# Patient Record
Sex: FEMALE | Race: WHITE | Employment: OTHER | URBAN - METROPOLITAN AREA
[De-identification: names, ages, dates, MRNs, and addresses within clinical notes are randomized per-mention and may not be internally consistent; named-entity substitution may affect disease eponyms.]

---

## 2018-03-05 ENCOUNTER — HOSPITAL ENCOUNTER (OUTPATIENT)
Dept: PREADMISSION TESTING | Age: 83
Discharge: HOME | End: 2018-03-05
Payer: MEDICARE

## 2018-03-05 VITALS
SYSTOLIC BLOOD PRESSURE: 121 MMHG | HEART RATE: 81 BPM | DIASTOLIC BLOOD PRESSURE: 60 MMHG | TEMPERATURE: 98.4 F | HEIGHT: 65 IN | BODY MASS INDEX: 20.49 KG/M2 | OXYGEN SATURATION: 96 % | WEIGHT: 123 LBS | RESPIRATION RATE: 20 BRPM

## 2018-03-05 LAB
ABO + RH BLD: NORMAL
ALBUMIN SERPL-MCNC: 3.7 G/DL (ref 3.5–5)
ALBUMIN/GLOB SERPL: 1.2 {RATIO} (ref 1.1–2.2)
ALP SERPL-CCNC: 44 U/L (ref 45–117)
ALT SERPL-CCNC: 32 U/L (ref 12–78)
ANION GAP SERPL CALC-SCNC: 9 MMOL/L (ref 5–15)
APPEARANCE UR: CLEAR
APTT PPP: 27.1 SEC (ref 22.1–32)
AST SERPL-CCNC: 22 U/L (ref 15–37)
ATRIAL RATE: 68 BPM
BACTERIA URNS QL MICRO: NEGATIVE /HPF
BASOPHILS # BLD: 0 K/UL (ref 0–0.1)
BASOPHILS NFR BLD: 0 % (ref 0–1)
BILIRUB SERPL-MCNC: 0.3 MG/DL (ref 0.2–1)
BILIRUB UR QL: NEGATIVE
BLOOD GROUP ANTIBODIES SERPL: NORMAL
BUN SERPL-MCNC: 17 MG/DL (ref 6–20)
BUN/CREAT SERPL: 29 (ref 12–20)
CALCIUM SERPL-MCNC: 9.4 MG/DL (ref 8.5–10.1)
CALCULATED P AXIS, ECG09: 79 DEGREES
CALCULATED R AXIS, ECG10: 73 DEGREES
CALCULATED T AXIS, ECG11: 60 DEGREES
CHLORIDE SERPL-SCNC: 106 MMOL/L (ref 97–108)
CO2 SERPL-SCNC: 29 MMOL/L (ref 21–32)
COLOR UR: NORMAL
CREAT SERPL-MCNC: 0.59 MG/DL (ref 0.55–1.02)
DIAGNOSIS, 93000: NORMAL
DIFFERENTIAL METHOD BLD: NORMAL
EOSINOPHIL # BLD: 0.2 K/UL (ref 0–0.4)
EOSINOPHIL NFR BLD: 5 % (ref 0–7)
EPITH CASTS URNS QL MICRO: NORMAL /LPF
ERYTHROCYTE [DISTWIDTH] IN BLOOD BY AUTOMATED COUNT: 12.7 % (ref 11.5–14.5)
EST. AVERAGE GLUCOSE BLD GHB EST-MCNC: 105 MG/DL
GLOBULIN SER CALC-MCNC: 3.2 G/DL (ref 2–4)
GLUCOSE SERPL-MCNC: 87 MG/DL (ref 65–100)
GLUCOSE UR STRIP.AUTO-MCNC: NEGATIVE MG/DL
HBA1C MFR BLD: 5.3 % (ref 4.2–6.3)
HCT VFR BLD AUTO: 42.5 % (ref 35–47)
HGB BLD-MCNC: 13.7 G/DL (ref 11.5–16)
HGB UR QL STRIP: NEGATIVE
HYALINE CASTS URNS QL MICRO: NORMAL /LPF (ref 0–5)
IMM GRANULOCYTES # BLD: 0 K/UL (ref 0–0.04)
IMM GRANULOCYTES NFR BLD AUTO: 0 % (ref 0–0.5)
INR PPP: 1 (ref 0.9–1.1)
KETONES UR QL STRIP.AUTO: NEGATIVE MG/DL
LEUKOCYTE ESTERASE UR QL STRIP.AUTO: NEGATIVE
LYMPHOCYTES # BLD: 1.1 K/UL (ref 0.8–3.5)
LYMPHOCYTES NFR BLD: 22 % (ref 12–49)
MCH RBC QN AUTO: 30.5 PG (ref 26–34)
MCHC RBC AUTO-ENTMCNC: 32.2 G/DL (ref 30–36.5)
MCV RBC AUTO: 94.7 FL (ref 80–99)
MONOCYTES # BLD: 0.5 K/UL (ref 0–1)
MONOCYTES NFR BLD: 10 % (ref 5–13)
NEUTS SEG # BLD: 3 K/UL (ref 1.8–8)
NEUTS SEG NFR BLD: 63 % (ref 32–75)
NITRITE UR QL STRIP.AUTO: NEGATIVE
NRBC # BLD: 0 K/UL (ref 0–0.01)
NRBC BLD-RTO: 0 PER 100 WBC
P-R INTERVAL, ECG05: 146 MS
PH UR STRIP: 6 [PH] (ref 5–8)
PLATELET # BLD AUTO: 243 K/UL (ref 150–400)
PMV BLD AUTO: 11.4 FL (ref 8.9–12.9)
POTASSIUM SERPL-SCNC: 4.5 MMOL/L (ref 3.5–5.1)
PROT SERPL-MCNC: 6.9 G/DL (ref 6.4–8.2)
PROT UR STRIP-MCNC: NEGATIVE MG/DL
PROTHROMBIN TIME: 10.2 SEC (ref 9–11.1)
Q-T INTERVAL, ECG07: 376 MS
QRS DURATION, ECG06: 88 MS
QTC CALCULATION (BEZET), ECG08: 399 MS
RBC # BLD AUTO: 4.49 M/UL (ref 3.8–5.2)
RBC #/AREA URNS HPF: NORMAL /HPF (ref 0–5)
SODIUM SERPL-SCNC: 144 MMOL/L (ref 136–145)
SP GR UR REFRACTOMETRY: 1.02 (ref 1–1.03)
SPECIMEN EXP DATE BLD: NORMAL
THERAPEUTIC RANGE,PTTT: NORMAL SECS (ref 58–77)
UA: UC IF INDICATED,UAUC: NORMAL
UROBILINOGEN UR QL STRIP.AUTO: 0.2 EU/DL (ref 0.2–1)
VENTRICULAR RATE, ECG03: 68 BPM
WBC # BLD AUTO: 4.8 K/UL (ref 3.6–11)
WBC URNS QL MICRO: NORMAL /HPF (ref 0–4)

## 2018-03-05 PROCEDURE — 85610 PROTHROMBIN TIME: CPT | Performed by: ORTHOPAEDIC SURGERY

## 2018-03-05 PROCEDURE — 81001 URINALYSIS AUTO W/SCOPE: CPT | Performed by: ORTHOPAEDIC SURGERY

## 2018-03-05 PROCEDURE — 85730 THROMBOPLASTIN TIME PARTIAL: CPT | Performed by: ORTHOPAEDIC SURGERY

## 2018-03-05 PROCEDURE — 86900 BLOOD TYPING SEROLOGIC ABO: CPT | Performed by: ORTHOPAEDIC SURGERY

## 2018-03-05 PROCEDURE — 83036 HEMOGLOBIN GLYCOSYLATED A1C: CPT | Performed by: ORTHOPAEDIC SURGERY

## 2018-03-05 PROCEDURE — 80053 COMPREHEN METABOLIC PANEL: CPT | Performed by: ORTHOPAEDIC SURGERY

## 2018-03-05 PROCEDURE — 85025 COMPLETE CBC W/AUTO DIFF WBC: CPT | Performed by: ORTHOPAEDIC SURGERY

## 2018-03-05 PROCEDURE — 93005 ELECTROCARDIOGRAM TRACING: CPT

## 2018-03-05 PROCEDURE — 36415 COLL VENOUS BLD VENIPUNCTURE: CPT | Performed by: ORTHOPAEDIC SURGERY

## 2018-03-05 RX ORDER — RISEDRONATE SODIUM 150 MG/1
150 TABLET, FILM COATED ORAL
COMMUNITY

## 2018-03-05 NOTE — PERIOP NOTES
[facility]  PREOPERATIVE INSTRUCTIONS    Surgery Date:   3/19/18      Surgery arrival time given by surgeon: NO  (If [facility] staff will call you between 3pm - 7pm the day before surgery with your arrival time. If your surgery is on a Monday, we will call you the preceding Friday. Please call [phone] after 7pm if you did not receive your arrival time.)  1. Report  to the 2nd Floor Admitting Desk on the day of your surgery. Bring your insurance card, photo identification, and any copayment (if applicable). 2. You must have a responsible adult to drive you home and stay with you the first 24 hours after surgery if you are going home the same day of your surgery. 3. Nothing to eat or drink after midnight the night before surgery. This means NO water, gum, mints, coffee, juice, etc.    4. MEDICATIONS TO TAKE THE MORNING OF SURGERY WITH A SIP OF WATER:Levothyroxine  5. No alcoholic beverages 24 hours before and after your surgery. 6. If you are being admitted to the hospital,please leave personal belongings/luggage in your car until you have an assigned hospital room number. ( The hospital discharge time is 12 PM NOON. Your adult  should be at the hospital prior to the noon discharge time unless otherwise instructed.)   7. STOP Aspirin and/or any non-steroidal anti-inflammatory drugs (i.e. Ibuprofen, Naproxen, Advil, Aleve) as directed by your surgeon. You may take Tylenol. Stop herbal supplements 1 week prior to  surgery. 8. If you are currently taking Plavix, Coumadin,or any other blood-thinning/ anticoagulant medication contact your surgeon for instructions. 9. Wear comfortable clothes. Wear your glasses instead of contacts. Please leave all money, jewelry and valuables at home. No make up, particularly mascara, the day of surgery. 10.  REMOVE ALL body piercings, rings,and jewelry and leave at home. Wear your hair loose or down, no pony-tails, buns, or any metal hair clips.    11. If you shower the morning of surgery, please do not apply any lotions, powders, or deodorants afterwards. Do not shave any body area within 24 hours of your surgery. 12. Please follow all instructions to avoid any potential surgical cancellation. 13. Should your physical condition change, (i.e. fever, cold, flu, etc.) please notify your surgeon as soon as possible. 14. It is important to be on time. If a situation occurs where you may be delayed, please call:  [phone] / ( on the day of surgery. 15. The Preadmission Testing staff can be reached at [phone]. 16.  Special Instructions:  · Use Chlorhexidine Care Fusion wash and sponges 3 days prior to surgery as instructed. · Incentive spirometer given with instructions to practice at home and bring back to the hospital on the day of surgery. · [facility] will contact you if your Hemoglobin A1C is greater than 7.5. · Ensure/Glucerna  sample, nutritional information, and Ensure/Glucerna coupon given. · Pain pamphlet and Call Don't Fall reminder reviewed with patient. ·  parking is complimentary Monday - Friday 7 am - 5 pm  · Bring PTA Medication list day of surgery with the last doses taken documented   · Do not bring medication bottles the day of surgery  16. The patient was contacted  in person. She  verbalize  understanding of all instructions does not  need reinforcement.

## 2018-03-05 NOTE — H&P
PAT Pre-Op History & Physical    Patient: Jesus Alas                  MRN: [id number]          SSN: [id number]  YOB: 1935          Age: [de-identified] y.o. Sex: female                Subjective:     Patient is a [de-identified] y.o.  female who presents with history of fracturing her left shoulder in 2012. She feels like after surgery her shoulder never really healed well. She has had several surgeries since that time. Pain is located in the shoulder and down into the bicep. She states she can feel the head of the shoulder joint pressing forward. She has limited ROM to shoulder. Left arm is weaker. She is right hand dominant. Activity makes the pain worse. Rates pain 2/10 to 5/10. The patient was evaluated in the surgeon's office and it was determined that the most appropriate plan of care is to proceed with surgical intervention.   Patient's PCP Ashanti Sheppard MD      Past Medical History:   Diagnosis Date    Arthritis     Cornea abrasion     Bilateral    Endometrial cancer ([facility] [address] 12/1990    H/O seasonal allergies     Headache(784.0)     Heart disease     Hypercholesteremia     Nausea & vomiting     Skin cancer, basal cell 1999 2001, 2005, 2011                     Scalp, lip and lower leg    Thyroid disease 1980      Past Surgical History:   Procedure Laterality Date    HX FRACTURE TX Left 11/2012    Shoulder    HX HYSTERECTOMY  1990    HX LUMBAR DISKECTOMY N/A 07/2015    HX MALIGNANT SKIN LESION EXCISION N/A 06/1999    Scalp                          Basal Cell    HX MALIGNANT SKIN LESION EXCISION N/A 2001    Nose and Lip- 2001, 2005, 2011                  Basal Cell    HX MALIGNANT SKIN LESION EXCISION Left 09/2017    Lower Leg                            Basal Cell    HX PARATHYROIDECTOMY Right 1980    HX SHOULDER ARTHROSCOPY Left 04/2013    Unsuccessful shaving    HX SHOULDER ARTHROSCOPY Left 02/2014    Capsular release, debridement, open subscapularis repair & lysis of adhesions    HX SHOULDER REPLACEMENT Left 10/2013    HX THYROIDECTOMY Right 03/1980    HX TONSIL AND ADENOIDECTOMY      AS CHILD    HX TUBAL LIGATION  1972      Prior to Admission medications    Medication Sig Start Date End Date Taking? Authorizing Provider   GLUCOSA EDWARD 2KCL/CHONDROITIN EDWARD (GLUCOSAMINE SULF-CHONDROITIN PO) Take 2 Caps by mouth two (2) times a day. 1500mg/1200mg   Yes Historical Provider   risedronate (ACTONEL) 150 mg tablet Take 150 mg by mouth every thirty (30) days. Yes Historical Provider   Calcium-Cholecalciferol, D3, (CALCIUM 600 WITH VITAMIN D3) 600 mg(1,500mg) -400 unit cap Take 1 Tab by mouth daily as needed. Yes Historical Provider   sodium chloride (MAGALY 128) 5 % ophthalmic ointment Administer 1 Drop to both eyes as needed. Yes Historical Provider   cholecalciferol, vitamin D3, (VITAMIN D3) 2,000 unit Tab Take  by mouth daily. Yes Historical Provider   levothyroxine (SYNTHROID) 88 mcg tablet Take  by mouth Daily (before breakfast). Yes Historical Provider   simvastatin (ZOCOR) 20 mg tablet Take  by mouth nightly. Yes Historical Provider   aspirin 81 mg tablet Take 81 mg by mouth nightly.    Yes Historical Provider        Allergies   Allergen Reactions    Adhesive Other (comments)     Has had skin abrasions    Codeine Nausea Only    Hydrocod-Cpm-Pe-Acetaminophen Nausea Only     Not allergic to the tylenol part      Social History   Substance Use Topics    Smoking status: Never Smoker    Smokeless tobacco: Never Used    Alcohol use 3.5 oz/week     7 Glasses of wine per week      Comment: 1 WINE NIGHTLY      History   Drug Use No     Family History   Problem Relation Age of Onset    Stroke Mother     Heart Disease Father     Asthma Maternal Grandmother     Heart Disease Maternal Grandmother     No Known Problems Sister     Cancer Maternal Aunt      breast    Parkinsonism Maternal Uncle     Malignant Hyperthermia Neg Hx     Pseudocholinesterase Deficiency Neg Hx     Delayed Awakening Neg Hx     Post-op Nausea/Vomiting Neg Hx     Post-op Cognitive Dysfunction Neg Hx     Emergence Delirium Neg Hx          Review of Systems    Patient denies difficulty swallowing, mouth sores, or loose teeth. Patient denies any recent dental procedures or any planned prior to surgery. Patient denies chest pain, tightness or palpitations. Denies dizziness, visual disturbances, or lightheadedness. Patient denies shortness of breath, wheezing, cough, fever, or chills. Patient denies diarrhea, constipation, or abdominal pain. Patient denies urinary problems including dysuria, hesitancy, urgency, or incontinence. Denies skin breakdown, rashes, insect bites or open area. Objective:     Vital Signs: 98.4, 81, 20, 96%, 121/60    Body mass index is 20.47 kg/(m^2). Wt Readings from Last 1 Encounters:   03/05/18 55.8 kg (123 lb)        Physical Exam:     General: Pleasant,  cooperative, no apparent distress, appears stated age. Eyes: Conjunctivae/corneas clear. EOMs intact. Nose: Nares normal.   Mouth/Throat: Lips, mucosa, and tongue normal. Teeth and gums normal.   Lungs: Clear to auscultation bilaterally. Heart: Regular rate and rhythm, S1, S2 normal. No murmur, click, rub or gallop. Abdomen: Soft, non-tender. Bowel sounds normal. No distention. Musculoskeletal:  Limited ROM to left shoulder,  strong. Extremities:  Extremities normal, atraumatic, no cyanosis or edema. Calves                                 supple, non tender to palpation. Pulses: 2+ and symmetric bilateral upper extremities. Cap. refill <2 seconds   Skin: Skin color, texture, turgor normal. No visible open areas, examined fully clothed   Neurologic: CN II-XII grossly intact. Alert and oriented x3.     Labs:   Recent Results (from the past 72 hour(s))   CBC WITH AUTOMATED DIFF    Collection Time: 03/05/18  8:29 AM   Result Value Ref Range    WBC 4.8 3.6 - 11.0 K/uL    RBC 4.49 3.80 - 5.20 M/uL    HGB 13.7 11.5 - 16.0 g/dL    HCT 42.5 35.0 - 47.0 %    MCV 94.7 80.0 - 99.0 FL    MCH 30.5 26.0 - 34.0 PG    MCHC 32.2 30.0 - 36.5 g/dL    RDW 12.7 11.5 - 14.5 %    PLATELET 750 166 - 900 K/uL    MPV 11.4 8.9 - 12.9 FL    NRBC 0.0 0  WBC    ABSOLUTE NRBC 0.00 0.00 - 0.01 K/uL    NEUTROPHILS 63 32 - 75 %    LYMPHOCYTES 22 12 - 49 %    MONOCYTES 10 5 - 13 %    EOSINOPHILS 5 0 - 7 %    BASOPHILS 0 0 - 1 %    IMMATURE GRANULOCYTES 0 0.0 - 0.5 %    ABS. NEUTROPHILS 3.0 1.8 - 8.0 K/UL    ABS. LYMPHOCYTES 1.1 0.8 - 3.5 K/UL    ABS. MONOCYTES 0.5 0.0 - 1.0 K/UL    ABS. EOSINOPHILS 0.2 0.0 - 0.4 K/UL    ABS. BASOPHILS 0.0 0.0 - 0.1 K/UL    ABS. IMM. GRANS. 0.0 0.00 - 0.04 K/UL    DF AUTOMATED     METABOLIC PANEL, COMPREHENSIVE    Collection Time: 03/05/18  8:29 AM   Result Value Ref Range    Sodium 144 136 - 145 mmol/L    Potassium 4.5 3.5 - 5.1 mmol/L    Chloride 106 97 - 108 mmol/L    CO2 29 21 - 32 mmol/L    Anion gap 9 5 - 15 mmol/L    Glucose 87 65 - 100 mg/dL    BUN 17 6 - 20 MG/DL    Creatinine 0.59 0.55 - 1.02 MG/DL    BUN/Creatinine ratio 29 (H) 12 - 20      GFR est AA >60 >60 ml/min/1.73m2    GFR est non-AA >60 >60 ml/min/1.73m2    Calcium 9.4 8.5 - 10.1 MG/DL    Bilirubin, total 0.3 0.2 - 1.0 MG/DL    ALT (SGPT) 32 12 - 78 U/L    AST (SGOT) 22 15 - 37 U/L    Alk.  phosphatase 44 (L) 45 - 117 U/L    Protein, total 6.9 6.4 - 8.2 g/dL    Albumin 3.7 3.5 - 5.0 g/dL    Globulin 3.2 2.0 - 4.0 g/dL    A-G Ratio 1.2 1.1 - 2.2     HEMOGLOBIN A1C WITH EAG    Collection Time: 03/05/18  8:29 AM   Result Value Ref Range    Hemoglobin A1c 5.3 4.2 - 6.3 %    Est. average glucose 105 mg/dL   PROTHROMBIN TIME + INR    Collection Time: 03/05/18  8:29 AM   Result Value Ref Range    INR 1.0 0.9 - 1.1      Prothrombin time 10.2 9.0 - 11.1 sec   PTT    Collection Time: 03/05/18  8:29 AM   Result Value Ref Range    aPTT 27.1 22.1 - 32.0 sec    aPTT, therapeutic range     58.0 - 77.0 SECS   URINALYSIS W/ REFLEX CULTURE    Collection Time: 03/05/18  8:29 AM   Result Value Ref Range    Color YELLOW/STRAW      Appearance CLEAR CLEAR      Specific gravity 1.018 1.003 - 1.030      pH (UA) 6.0 5.0 - 8.0      Protein NEGATIVE  NEG mg/dL    Glucose NEGATIVE  NEG mg/dL    Ketone NEGATIVE  NEG mg/dL    Bilirubin NEGATIVE  NEG      Blood NEGATIVE  NEG      Urobilinogen 0.2 0.2 - 1.0 EU/dL    Nitrites NEGATIVE  NEG      Leukocyte Esterase NEGATIVE  NEG      WBC 0-4 0 - 4 /hpf    RBC 0-5 0 - 5 /hpf    Epithelial cells FEW FEW /lpf    Bacteria NEGATIVE  NEG /hpf    UA:UC IF INDICATED CULTURE NOT INDICATED BY UA RESULT CNI      Hyaline cast 0-2 0 - 5 /lpf   TYPE & SCREEN    Collection Time: 03/05/18  8:29 AM   Result Value Ref Range    Crossmatch Expiration 03/19/2018     ABO/Rh(D) A POSITIVE     Antibody screen NEG    EKG, 12 LEAD, INITIAL    Collection Time: 03/05/18  9:09 AM   Result Value Ref Range    Ventricular Rate 68 BPM    Atrial Rate 68 BPM    P-R Interval 146 ms    QRS Duration 88 ms    Q-T Interval 376 ms    QTC Calculation (Bezet) 399 ms    Calculated P Axis 79 degrees    Calculated R Axis 73 degrees    Calculated T Axis 60 degrees    Diagnosis       Normal sinus rhythm with sinus arrhythmia  Possible Left atrial enlargement  Left ventricular hypertrophy  Nonspecific ST abnormality  Abnormal ECG    Confirmed by James Mccray ([id number]) on 3/5/2018 5:16:13 PM         Assessment:     Painful Hardware    Plan:     Scheduled for REVISION LEFT TOTAL SHOULDER ARTHROPLASTY TO REVERSE TOTAL SHOULDER ARTHROPLASTY     All labs reviewed and unremarkable. EKG reviewed. MRSA pending. Cardiac clearance was obtained by patient in January 2018. Clearance reviewed and placed on chart.      Rolanda Torres NP

## 2018-03-06 LAB
BACTERIA SPEC CULT: NORMAL
BACTERIA SPEC CULT: NORMAL
SERVICE CMNT-IMP: NORMAL

## 2018-03-16 ENCOUNTER — ANESTHESIA EVENT (OUTPATIENT)
Dept: SURGERY | Age: 83
DRG: 483 | End: 2018-03-16
Payer: MEDICARE

## 2018-03-19 ENCOUNTER — ANESTHESIA (OUTPATIENT)
Dept: SURGERY | Age: 83
DRG: 483 | End: 2018-03-19
Payer: MEDICARE

## 2018-03-19 ENCOUNTER — HOSPITAL ENCOUNTER (INPATIENT)
Age: 83
LOS: 1 days | Discharge: HOME | DRG: 483 | End: 2018-03-20
Attending: ORTHOPAEDIC SURGERY | Admitting: ORTHOPAEDIC SURGERY
Payer: MEDICARE

## 2018-03-19 ENCOUNTER — APPOINTMENT (OUTPATIENT)
Dept: GENERAL RADIOLOGY | Age: 83
DRG: 483 | End: 2018-03-19
Attending: ORTHOPAEDIC SURGERY
Payer: MEDICARE

## 2018-03-19 DIAGNOSIS — M19.012 PRIMARY LOCALIZED OSTEOARTHROSIS OF LEFT SHOULDER REGION: Primary | ICD-10-CM

## 2018-03-19 PROBLEM — E78.00 HYPERCHOLESTEREMIA: Chronic | Status: ACTIVE | Noted: 2018-03-19

## 2018-03-19 PROBLEM — I51.9 HEART DISEASE: Chronic | Status: ACTIVE | Noted: 2018-03-19

## 2018-03-19 PROBLEM — E03.9 ACQUIRED HYPOTHYROIDISM: Chronic | Status: ACTIVE | Noted: 2018-03-19

## 2018-03-19 PROBLEM — S05.00XA CORNEA ABRASION: Chronic | Status: ACTIVE | Noted: 2018-03-19

## 2018-03-19 PROBLEM — T84.84XA PAINFUL ORTHOPAEDIC HARDWARE (HCC): Status: ACTIVE | Noted: 2018-03-19

## 2018-03-19 PROCEDURE — 77030010507 HC ADH SKN DERMBND J&J -B

## 2018-03-19 PROCEDURE — 74011250636 HC RX REV CODE- 250/636

## 2018-03-19 PROCEDURE — 64415 NJX AA&/STRD BRCH PLXS IMG: CPT

## 2018-03-19 PROCEDURE — 77030020471 HC BIT DRL CREV ZIMM -C: Performed by: ORTHOPAEDIC SURGERY

## 2018-03-19 PROCEDURE — 77030018836 HC SOL IRR NACL ICUM -A: Performed by: ORTHOPAEDIC SURGERY

## 2018-03-19 PROCEDURE — 0RPK0JZ REMOVAL OF SYNTHETIC SUBSTITUTE FROM LEFT SHOULDER JOINT, OPEN APPROACH: ICD-10-PCS | Performed by: ORTHOPAEDIC SURGERY

## 2018-03-19 PROCEDURE — 77030020782 HC GWN BAIR PAWS FLX 3M -B

## 2018-03-19 PROCEDURE — 77030020788: Performed by: ORTHOPAEDIC SURGERY

## 2018-03-19 PROCEDURE — 74011000272 HC RX REV CODE- 272: Performed by: ORTHOPAEDIC SURGERY

## 2018-03-19 PROCEDURE — C1776 JOINT DEVICE (IMPLANTABLE): HCPCS | Performed by: ORTHOPAEDIC SURGERY

## 2018-03-19 PROCEDURE — 3E0T3BZ INTRODUCTION OF ANESTHETIC AGENT INTO PERIPHERAL NERVES AND PLEXI, PERCUTANEOUS APPROACH: ICD-10-PCS | Performed by: ANESTHESIOLOGY

## 2018-03-19 PROCEDURE — 77030018673: Performed by: ORTHOPAEDIC SURGERY

## 2018-03-19 PROCEDURE — 73020 X-RAY EXAM OF SHOULDER: CPT

## 2018-03-19 PROCEDURE — 74011250636 HC RX REV CODE- 250/636: Performed by: ANESTHESIOLOGY

## 2018-03-19 PROCEDURE — 74011250637 HC RX REV CODE- 250/637: Performed by: ORTHOPAEDIC SURGERY

## 2018-03-19 PROCEDURE — 74011000250 HC RX REV CODE- 250

## 2018-03-19 PROCEDURE — 76010000171 HC OR TIME 2 TO 2.5 HR INTENSV-TIER 1: Performed by: ORTHOPAEDIC SURGERY

## 2018-03-19 PROCEDURE — 77030032497 HC WRP SHLDR WO BGS SOLM -B

## 2018-03-19 PROCEDURE — 74011000250 HC RX REV CODE- 250: Performed by: ORTHOPAEDIC SURGERY

## 2018-03-19 PROCEDURE — 77030011640 HC PAD GRND REM COVD -A: Performed by: ORTHOPAEDIC SURGERY

## 2018-03-19 PROCEDURE — 77030013079 HC BLNKT BAIR HGGR 3M -A: Performed by: ANESTHESIOLOGY

## 2018-03-19 PROCEDURE — 77010033678 HC OXYGEN DAILY

## 2018-03-19 PROCEDURE — 74011250637 HC RX REV CODE- 250/637: Performed by: ANESTHESIOLOGY

## 2018-03-19 PROCEDURE — 77030013708 HC HNDPC SUC IRR PULS STRY –B: Performed by: ORTHOPAEDIC SURGERY

## 2018-03-19 PROCEDURE — 74011000250 HC RX REV CODE- 250: Performed by: ANESTHESIOLOGY

## 2018-03-19 PROCEDURE — 77030013837 HC NERV BLK KT BBMI -B

## 2018-03-19 PROCEDURE — 65270000029 HC RM PRIVATE

## 2018-03-19 PROCEDURE — 77030010507 HC ADH SKN DERMBND J&J -B: Performed by: ORTHOPAEDIC SURGERY

## 2018-03-19 PROCEDURE — 76210000016 HC OR PH I REC 1 TO 1.5 HR: Performed by: ORTHOPAEDIC SURGERY

## 2018-03-19 PROCEDURE — 77030031139 HC SUT VCRL2 J&J -A: Performed by: ORTHOPAEDIC SURGERY

## 2018-03-19 PROCEDURE — 77030026438 HC STYL ET INTUB CARD -A: Performed by: ANESTHESIOLOGY

## 2018-03-19 PROCEDURE — 74011250636 HC RX REV CODE- 250/636: Performed by: ORTHOPAEDIC SURGERY

## 2018-03-19 PROCEDURE — 0RRK00Z REPLACEMENT OF LEFT SHOULDER JOINT WITH REVERSE BALL AND SOCKET SYNTHETIC SUBSTITUTE, OPEN APPROACH: ICD-10-PCS | Performed by: ORTHOPAEDIC SURGERY

## 2018-03-19 PROCEDURE — 77030008684 HC TU ET CUF COVD -B: Performed by: ANESTHESIOLOGY

## 2018-03-19 PROCEDURE — 74011250637 HC RX REV CODE- 250/637: Performed by: FAMILY MEDICINE

## 2018-03-19 PROCEDURE — 77030018547 HC SUT ETHBND1 J&J -B: Performed by: ORTHOPAEDIC SURGERY

## 2018-03-19 PROCEDURE — 77030018883 HC BLD SAW SAG4 STRY -B: Performed by: ORTHOPAEDIC SURGERY

## 2018-03-19 PROCEDURE — 77030002933 HC SUT MCRYL J&J -A: Performed by: ORTHOPAEDIC SURGERY

## 2018-03-19 PROCEDURE — 77030019908 HC STETH ESOPH SIMS -A: Performed by: ANESTHESIOLOGY

## 2018-03-19 PROCEDURE — 77030028700 HC BLD TISS PLSM MEDT -E: Performed by: ORTHOPAEDIC SURGERY

## 2018-03-19 PROCEDURE — 76060000035 HC ANESTHESIA 2 TO 2.5 HR: Performed by: ORTHOPAEDIC SURGERY

## 2018-03-19 PROCEDURE — 77030002922 HC SUT FBRWRE ARTH -B: Performed by: ORTHOPAEDIC SURGERY

## 2018-03-19 DEVICE — IMPLANTABLE DEVICE
Type: IMPLANTABLE DEVICE | Site: SHOULDER | Status: FUNCTIONAL
Brand: COMPREHENSIVE REVERSE SHOULDER

## 2018-03-19 DEVICE — IMPLANTABLE DEVICE
Type: IMPLANTABLE DEVICE | Site: SHOULDER | Status: FUNCTIONAL
Brand: COMPREHENSIVE® REVERSE SHOULDER

## 2018-03-19 RX ORDER — ROPIVACAINE HYDROCHLORIDE 5 MG/ML
INJECTION, SOLUTION EPIDURAL; INFILTRATION; PERINEURAL AS NEEDED
Status: DISCONTINUED | OUTPATIENT
Start: 2018-03-19 | End: 2018-03-19 | Stop reason: HOSPADM

## 2018-03-19 RX ORDER — ASPIRIN 325 MG
325 TABLET, DELAYED RELEASE (ENTERIC COATED) ORAL 2 TIMES DAILY
Status: DISCONTINUED | OUTPATIENT
Start: 2018-03-19 | End: 2018-03-20 | Stop reason: HOSPADM

## 2018-03-19 RX ORDER — MIDAZOLAM HYDROCHLORIDE 1 MG/ML
INJECTION, SOLUTION INTRAMUSCULAR; INTRAVENOUS AS NEEDED
Status: DISCONTINUED | OUTPATIENT
Start: 2018-03-19 | End: 2018-03-19 | Stop reason: HOSPADM

## 2018-03-19 RX ORDER — SIMVASTATIN 20 MG/1
20 TABLET, FILM COATED ORAL
Status: DISCONTINUED | OUTPATIENT
Start: 2018-03-19 | End: 2018-03-20 | Stop reason: HOSPADM

## 2018-03-19 RX ORDER — KETOROLAC TROMETHAMINE 30 MG/ML
15 INJECTION, SOLUTION INTRAMUSCULAR; INTRAVENOUS
Status: DISCONTINUED | OUTPATIENT
Start: 2018-03-19 | End: 2018-03-19 | Stop reason: HOSPADM

## 2018-03-19 RX ORDER — CEFAZOLIN SODIUM/WATER 2 G/20 ML
2 SYRINGE (ML) INTRAVENOUS ONCE
Status: COMPLETED | OUTPATIENT
Start: 2018-03-19 | End: 2018-03-19

## 2018-03-19 RX ORDER — SODIUM CHLORIDE 0.9 % (FLUSH) 0.9 %
5-10 SYRINGE (ML) INJECTION AS NEEDED
Status: DISCONTINUED | OUTPATIENT
Start: 2018-03-19 | End: 2018-03-19 | Stop reason: HOSPADM

## 2018-03-19 RX ORDER — SODIUM CHLORIDE, SODIUM LACTATE, POTASSIUM CHLORIDE, CALCIUM CHLORIDE 600; 310; 30; 20 MG/100ML; MG/100ML; MG/100ML; MG/100ML
100 INJECTION, SOLUTION INTRAVENOUS CONTINUOUS
Status: DISCONTINUED | OUTPATIENT
Start: 2018-03-19 | End: 2018-03-19 | Stop reason: HOSPADM

## 2018-03-19 RX ORDER — NEOSTIGMINE METHYLSULFATE 1 MG/ML
INJECTION INTRAVENOUS AS NEEDED
Status: DISCONTINUED | OUTPATIENT
Start: 2018-03-19 | End: 2018-03-19 | Stop reason: HOSPADM

## 2018-03-19 RX ORDER — LIDOCAINE HYDROCHLORIDE 20 MG/ML
INJECTION, SOLUTION EPIDURAL; INFILTRATION; INTRACAUDAL; PERINEURAL AS NEEDED
Status: DISCONTINUED | OUTPATIENT
Start: 2018-03-19 | End: 2018-03-19 | Stop reason: HOSPADM

## 2018-03-19 RX ORDER — PROPOFOL 10 MG/ML
INJECTION, EMULSION INTRAVENOUS AS NEEDED
Status: DISCONTINUED | OUTPATIENT
Start: 2018-03-19 | End: 2018-03-19 | Stop reason: HOSPADM

## 2018-03-19 RX ORDER — ROCURONIUM BROMIDE 10 MG/ML
INJECTION, SOLUTION INTRAVENOUS AS NEEDED
Status: DISCONTINUED | OUTPATIENT
Start: 2018-03-19 | End: 2018-03-19

## 2018-03-19 RX ORDER — ROCURONIUM BROMIDE 10 MG/ML
INJECTION, SOLUTION INTRAVENOUS AS NEEDED
Status: DISCONTINUED | OUTPATIENT
Start: 2018-03-19 | End: 2018-03-19 | Stop reason: HOSPADM

## 2018-03-19 RX ORDER — DIPHENHYDRAMINE HCL 12.5MG/5ML
12.5 ELIXIR ORAL
Status: DISCONTINUED | OUTPATIENT
Start: 2018-03-19 | End: 2018-03-20 | Stop reason: HOSPADM

## 2018-03-19 RX ORDER — OXYCODONE HYDROCHLORIDE 5 MG/1
5 TABLET ORAL
Status: DISCONTINUED | OUTPATIENT
Start: 2018-03-19 | End: 2018-03-20

## 2018-03-19 RX ORDER — SODIUM CHLORIDE 0.9 % (FLUSH) 0.9 %
5-10 SYRINGE (ML) INJECTION EVERY 8 HOURS
Status: DISCONTINUED | OUTPATIENT
Start: 2018-03-19 | End: 2018-03-19 | Stop reason: HOSPADM

## 2018-03-19 RX ORDER — CEFAZOLIN SODIUM/WATER 2 G/20 ML
2 SYRINGE (ML) INTRAVENOUS EVERY 8 HOURS
Status: COMPLETED | OUTPATIENT
Start: 2018-03-19 | End: 2018-03-20

## 2018-03-19 RX ORDER — AMOXICILLIN 250 MG
1 CAPSULE ORAL 2 TIMES DAILY
Status: DISCONTINUED | OUTPATIENT
Start: 2018-03-19 | End: 2018-03-20 | Stop reason: HOSPADM

## 2018-03-19 RX ORDER — ACETAMINOPHEN 325 MG/1
975 TABLET ORAL ONCE
Status: COMPLETED | OUTPATIENT
Start: 2018-03-19 | End: 2018-03-19

## 2018-03-19 RX ORDER — OXYCODONE HYDROCHLORIDE 5 MG/1
5 TABLET ORAL
Status: DISCONTINUED | OUTPATIENT
Start: 2018-03-19 | End: 2018-03-19 | Stop reason: HOSPADM

## 2018-03-19 RX ORDER — POLYETHYLENE GLYCOL 3350 17 G/17G
17 POWDER, FOR SOLUTION ORAL DAILY
Status: DISCONTINUED | OUTPATIENT
Start: 2018-03-20 | End: 2018-03-20 | Stop reason: HOSPADM

## 2018-03-19 RX ORDER — ONDANSETRON 2 MG/ML
INJECTION INTRAMUSCULAR; INTRAVENOUS AS NEEDED
Status: DISCONTINUED | OUTPATIENT
Start: 2018-03-19 | End: 2018-03-19 | Stop reason: HOSPADM

## 2018-03-19 RX ORDER — CELECOXIB 100 MG/1
100 CAPSULE ORAL ONCE
Status: COMPLETED | OUTPATIENT
Start: 2018-03-19 | End: 2018-03-19

## 2018-03-19 RX ORDER — SODIUM CHLORIDE 0.9 % (FLUSH) 0.9 %
5-10 SYRINGE (ML) INJECTION EVERY 8 HOURS
Status: DISCONTINUED | OUTPATIENT
Start: 2018-03-19 | End: 2018-03-20 | Stop reason: HOSPADM

## 2018-03-19 RX ORDER — DEXAMETHASONE SODIUM PHOSPHATE 4 MG/ML
INJECTION, SOLUTION INTRA-ARTICULAR; INTRALESIONAL; INTRAMUSCULAR; INTRAVENOUS; SOFT TISSUE AS NEEDED
Status: DISCONTINUED | OUTPATIENT
Start: 2018-03-19 | End: 2018-03-19 | Stop reason: HOSPADM

## 2018-03-19 RX ORDER — ROPIVACAINE HYDROCHLORIDE 5 MG/ML
INJECTION, SOLUTION EPIDURAL; INFILTRATION; PERINEURAL AS NEEDED
Status: DISCONTINUED | OUTPATIENT
Start: 2018-03-19 | End: 2018-03-19

## 2018-03-19 RX ORDER — LIDOCAINE HYDROCHLORIDE 10 MG/ML
0.1 INJECTION, SOLUTION EPIDURAL; INFILTRATION; INTRACAUDAL; PERINEURAL AS NEEDED
Status: DISCONTINUED | OUTPATIENT
Start: 2018-03-19 | End: 2018-03-19 | Stop reason: HOSPADM

## 2018-03-19 RX ORDER — ONDANSETRON 2 MG/ML
4 INJECTION INTRAMUSCULAR; INTRAVENOUS
Status: DISCONTINUED | OUTPATIENT
Start: 2018-03-19 | End: 2018-03-20 | Stop reason: HOSPADM

## 2018-03-19 RX ORDER — SODIUM CHLORIDE 9 MG/ML
125 INJECTION, SOLUTION INTRAVENOUS CONTINUOUS
Status: DISPENSED | OUTPATIENT
Start: 2018-03-19 | End: 2018-03-20

## 2018-03-19 RX ORDER — SODIUM CHLORIDE 0.9 % (FLUSH) 0.9 %
5-10 SYRINGE (ML) INJECTION AS NEEDED
Status: DISCONTINUED | OUTPATIENT
Start: 2018-03-19 | End: 2018-03-20 | Stop reason: HOSPADM

## 2018-03-19 RX ORDER — EPHEDRINE SULFATE 50 MG/ML
INJECTION, SOLUTION INTRAVENOUS AS NEEDED
Status: DISCONTINUED | OUTPATIENT
Start: 2018-03-19 | End: 2018-03-19 | Stop reason: HOSPADM

## 2018-03-19 RX ORDER — FAMOTIDINE 20 MG/1
20 TABLET, FILM COATED ORAL 2 TIMES DAILY
Status: DISCONTINUED | OUTPATIENT
Start: 2018-03-19 | End: 2018-03-20 | Stop reason: HOSPADM

## 2018-03-19 RX ORDER — NALOXONE HYDROCHLORIDE 0.4 MG/ML
0.4 INJECTION, SOLUTION INTRAMUSCULAR; INTRAVENOUS; SUBCUTANEOUS AS NEEDED
Status: DISCONTINUED | OUTPATIENT
Start: 2018-03-19 | End: 2018-03-20 | Stop reason: HOSPADM

## 2018-03-19 RX ORDER — METOPROLOL TARTRATE 5 MG/5ML
2.5 INJECTION INTRAVENOUS ONCE
Status: COMPLETED | OUTPATIENT
Start: 2018-03-19 | End: 2018-03-19

## 2018-03-19 RX ORDER — FENTANYL CITRATE 50 UG/ML
25 INJECTION, SOLUTION INTRAMUSCULAR; INTRAVENOUS
Status: DISCONTINUED | OUTPATIENT
Start: 2018-03-19 | End: 2018-03-19 | Stop reason: HOSPADM

## 2018-03-19 RX ORDER — HYDROMORPHONE HYDROCHLORIDE 1 MG/ML
.25-1 INJECTION, SOLUTION INTRAMUSCULAR; INTRAVENOUS; SUBCUTANEOUS
Status: DISCONTINUED | OUTPATIENT
Start: 2018-03-19 | End: 2018-03-19 | Stop reason: HOSPADM

## 2018-03-19 RX ORDER — LEVOTHYROXINE SODIUM 88 UG/1
88 TABLET ORAL
Status: DISCONTINUED | OUTPATIENT
Start: 2018-03-20 | End: 2018-03-20 | Stop reason: HOSPADM

## 2018-03-19 RX ORDER — GLYCOPYRROLATE 0.2 MG/ML
INJECTION INTRAMUSCULAR; INTRAVENOUS AS NEEDED
Status: DISCONTINUED | OUTPATIENT
Start: 2018-03-19 | End: 2018-03-19 | Stop reason: HOSPADM

## 2018-03-19 RX ORDER — FENTANYL CITRATE 50 UG/ML
INJECTION, SOLUTION INTRAMUSCULAR; INTRAVENOUS AS NEEDED
Status: DISCONTINUED | OUTPATIENT
Start: 2018-03-19 | End: 2018-03-19 | Stop reason: HOSPADM

## 2018-03-19 RX ORDER — TRAMADOL HYDROCHLORIDE 50 MG/1
50 TABLET ORAL
Status: DISCONTINUED | OUTPATIENT
Start: 2018-03-19 | End: 2018-03-20 | Stop reason: HOSPADM

## 2018-03-19 RX ORDER — SUCCINYLCHOLINE CHLORIDE 20 MG/ML
INJECTION INTRAMUSCULAR; INTRAVENOUS AS NEEDED
Status: DISCONTINUED | OUTPATIENT
Start: 2018-03-19 | End: 2018-03-19 | Stop reason: HOSPADM

## 2018-03-19 RX ORDER — OXYCODONE HYDROCHLORIDE 5 MG/1
10 TABLET ORAL
Status: DISCONTINUED | OUTPATIENT
Start: 2018-03-19 | End: 2018-03-20

## 2018-03-19 RX ORDER — HYDROMORPHONE HYDROCHLORIDE 1 MG/ML
0.5 INJECTION, SOLUTION INTRAMUSCULAR; INTRAVENOUS; SUBCUTANEOUS
Status: DISCONTINUED | OUTPATIENT
Start: 2018-03-19 | End: 2018-03-20 | Stop reason: HOSPADM

## 2018-03-19 RX ADMIN — SUCCINYLCHOLINE CHLORIDE 100 MG: 20 INJECTION INTRAMUSCULAR; INTRAVENOUS at 08:42

## 2018-03-19 RX ADMIN — FENTANYL CITRATE 25 MCG: 50 INJECTION, SOLUTION INTRAMUSCULAR; INTRAVENOUS at 10:15

## 2018-03-19 RX ADMIN — NEOSTIGMINE METHYLSULFATE 3 MG: 1 INJECTION INTRAVENOUS at 10:28

## 2018-03-19 RX ADMIN — MIDAZOLAM HYDROCHLORIDE 1 MG: 1 INJECTION, SOLUTION INTRAMUSCULAR; INTRAVENOUS at 08:37

## 2018-03-19 RX ADMIN — SODIUM CHLORIDE 125 ML/HR: 900 INJECTION, SOLUTION INTRAVENOUS at 19:15

## 2018-03-19 RX ADMIN — ROPIVACAINE HYDROCHLORIDE 20 ML: 5 INJECTION, SOLUTION EPIDURAL; INFILTRATION; PERINEURAL at 07:37

## 2018-03-19 RX ADMIN — LIDOCAINE HYDROCHLORIDE 50 MG: 20 INJECTION, SOLUTION EPIDURAL; INFILTRATION; INTRACAUDAL; PERINEURAL at 08:42

## 2018-03-19 RX ADMIN — FENTANYL CITRATE 50 MCG: 50 INJECTION, SOLUTION INTRAMUSCULAR; INTRAVENOUS at 08:37

## 2018-03-19 RX ADMIN — GLYCOPYRROLATE 0.5 MG: 0.2 INJECTION INTRAMUSCULAR; INTRAVENOUS at 10:28

## 2018-03-19 RX ADMIN — BUPIVACAINE HYDROCHLORIDE 6 ML/HR: 2.5 INJECTION, SOLUTION EPIDURAL; INFILTRATION; INTRACAUDAL; PERINEURAL at 12:24

## 2018-03-19 RX ADMIN — SODIUM CHLORIDE, SODIUM LACTATE, POTASSIUM CHLORIDE, AND CALCIUM CHLORIDE 100 ML/HR: 600; 310; 30; 20 INJECTION, SOLUTION INTRAVENOUS at 06:52

## 2018-03-19 RX ADMIN — SODIUM CHLORIDE, SODIUM LACTATE, POTASSIUM CHLORIDE, AND CALCIUM CHLORIDE: 600; 310; 30; 20 INJECTION, SOLUTION INTRAVENOUS at 09:54

## 2018-03-19 RX ADMIN — FAMOTIDINE 20 MG: 20 TABLET, FILM COATED ORAL at 17:44

## 2018-03-19 RX ADMIN — Medication 2 G: at 09:02

## 2018-03-19 RX ADMIN — FENTANYL CITRATE 50 MCG: 50 INJECTION, SOLUTION INTRAMUSCULAR; INTRAVENOUS at 07:34

## 2018-03-19 RX ADMIN — DOCUSATE SODIUM AND SENNOSIDES 1 TABLET: 8.6; 5 TABLET, FILM COATED ORAL at 14:25

## 2018-03-19 RX ADMIN — EPHEDRINE SULFATE 10 MG: 50 INJECTION, SOLUTION INTRAVENOUS at 08:49

## 2018-03-19 RX ADMIN — DOCUSATE SODIUM AND SENNOSIDES 1 TABLET: 8.6; 5 TABLET, FILM COATED ORAL at 17:44

## 2018-03-19 RX ADMIN — DEXAMETHASONE SODIUM PHOSPHATE 4 MG: 4 INJECTION, SOLUTION INTRA-ARTICULAR; INTRALESIONAL; INTRAMUSCULAR; INTRAVENOUS; SOFT TISSUE at 09:08

## 2018-03-19 RX ADMIN — PROPOFOL 100 MG: 10 INJECTION, EMULSION INTRAVENOUS at 08:42

## 2018-03-19 RX ADMIN — Medication 2 G: at 14:25

## 2018-03-19 RX ADMIN — FAMOTIDINE 20 MG: 20 TABLET, FILM COATED ORAL at 14:25

## 2018-03-19 RX ADMIN — CELECOXIB 100 MG: 100 CAPSULE ORAL at 06:59

## 2018-03-19 RX ADMIN — ROCURONIUM BROMIDE 5 MG: 10 INJECTION, SOLUTION INTRAVENOUS at 08:42

## 2018-03-19 RX ADMIN — MIDAZOLAM HYDROCHLORIDE 2 MG: 1 INJECTION, SOLUTION INTRAMUSCULAR; INTRAVENOUS at 07:34

## 2018-03-19 RX ADMIN — Medication 10 ML: at 22:18

## 2018-03-19 RX ADMIN — EPHEDRINE SULFATE 10 MG: 50 INJECTION, SOLUTION INTRAVENOUS at 09:08

## 2018-03-19 RX ADMIN — ONDANSETRON 4 MG: 2 INJECTION INTRAMUSCULAR; INTRAVENOUS at 09:06

## 2018-03-19 RX ADMIN — Medication 2 G: at 22:18

## 2018-03-19 RX ADMIN — ROCURONIUM BROMIDE 25 MG: 10 INJECTION, SOLUTION INTRAVENOUS at 09:05

## 2018-03-19 RX ADMIN — ASPIRIN 325 MG: 325 TABLET, DELAYED RELEASE ORAL at 17:44

## 2018-03-19 RX ADMIN — SIMVASTATIN 20 MG: 20 TABLET, FILM COATED ORAL at 22:18

## 2018-03-19 RX ADMIN — SODIUM CHLORIDE 125 ML/HR: 900 INJECTION, SOLUTION INTRAVENOUS at 12:15

## 2018-03-19 RX ADMIN — EPHEDRINE SULFATE 10 MG: 50 INJECTION, SOLUTION INTRAVENOUS at 08:33

## 2018-03-19 RX ADMIN — METOROPROLOL TARTRATE 2.5 MG: 5 INJECTION, SOLUTION INTRAVENOUS at 12:07

## 2018-03-19 RX ADMIN — ACETAMINOPHEN 975 MG: 325 TABLET ORAL at 06:59

## 2018-03-19 NOTE — IP AVS SNAPSHOT
[address] [facility] 
 
 
 [address] 
[phone] Patient: Hilda Hartmann MRN: [id number] :[id number] About your hospitalization You were admitted on:  2018 You last received care in the:  [facility] 4M POST SURG ORT 2 You were discharged on:  2018 Why you were hospitalized Your primary diagnosis was:  Painful Orthopaedic Hardware (Hcc) Your diagnoses also included:  Acquired Hypothyroidism, Hypercholesteremia, Primary Localized Osteoarthrosis Of Shoulder Region, Heart Disease, Cornea Abrasion Follow-up Information Follow up With Details Comments Contact Info Orthopedic Physical Therapy On 3/21/2018 9:15AM [address] [address] 
[phone] Alexis Jimenez MD   [address] [facility] [address] [address] 
[phone] Discharge Orders None A check bryant indicates which time of day the medication should be taken. My Medications START taking these medications Instructions Each Dose to Equal  
 Morning Noon Evening Bedtime HYDROmorphone 2 mg tablet Commonly known as:  DILAUDID Take 1 Tab by mouth every four (4) hours as needed for Pain. Max Daily Amount: 12 mg.  
 2 mg CHANGE how you take these medications Instructions Each Dose to Equal  
 Morning Noon Evening Bedtime  
 aspirin 325 mg tablet Commonly known as:  ASPIRIN What changed:   
- medication strength 
- how much to take - when to take this 
- additional instructions Your last dose was:  930 am  
Your next dose is:  600pm  
   
 Take 1 Tab by mouth every twelve (12) hours for 14 days. Take with food 325 mg  
    
930 CONTINUE taking these medications Instructions Each Dose to Equal  
 Morning Noon Evening Bedtime CALCIUM 600 WITH VITAMIN D3 600 mg(1,500mg) -400 unit Cap Generic drug:  Calcium-Cholecalciferol (D3) Take 1 Tab by mouth daily as needed. 1 Tab GLUCOSAMINE SULF-CHONDROITIN PO Take 1 Cap by mouth two (2) times a day. 1500mg/1200mg 1 Cap  
    
   
   
   
  
 levothyroxine 88 mcg tablet Commonly known as:  SYNTHROID Your last dose was:  0704 am  
   
 Take  by mouth Daily (before breakfast). 0704 am  
   
   
   
  
 MAGALY 128 5 % ophthalmic ointment Generic drug:  sodium chloride Administer 1 Drop to both eyes as needed. 1 Drop  
    
   
   
   
  
 risedronate 150 mg tablet Commonly known as:  ACTONEL Take 150 mg by mouth every thirty (30) days. 150 mg  
    
   
   
   
  
 simvastatin 20 mg tablet Commonly known as:  ZOCOR Take  by mouth nightly. VITAMIN D3 2,000 unit Tab Generic drug:  cholecalciferol (vitamin D3) Take  by mouth daily. Where to Get Your Medications Information on where to get these meds will be given to you by the nurse or doctor. ! Ask your nurse or doctor about these medications  
  aspirin 325 mg tablet HYDROmorphone 2 mg tablet Discharge Instructions Shoulder Replacement Surgery Discharge Instructions Dr. Spencer Orozco Please take the time to review the following instructions before you leave the hospital and use them as guidelines during your recovery from surgery. If you have any questions, you may contact my office at [phone]. Wound Care / Dressing Change Do NOT remove your dressing. Keep the clear, plastic dressing intact until your follow up in the office. Chucky Akins / Marieta Goldmann If the clear plastic dressing is intact and there is no drainage, you may shower. If the dressing is loose or water gets under it please notify our office. If there is drainage, please call the office immediately. Sling Keep your arm in the immobilizer/sling at all times except when showering, changing your clothes, and doing the exercises shown to you by Dr. Scarlett Davila or your physical/occupational therapist prior to your discharge from the hospital.  Keep your arm at your side when changing your clothes and showering. Do not move it away from your body. Ice and Elevation Ice therapy should be used consistently for 48 hours after surgery. Subsequently, you should ice 3 times per day for 20 minutes at a time. After the first week, you may use ice as needed for pain and swelling. Please ensure there is protective barrier between your skin and the ice. Diet You may advance your regular diet as tolerated. Increase your clear liquid intake for the next 2-3 days. Medications Your prescriptions were sent to the pharmacy on file. We are unable to change the  narcotic prescription that has already been sent today. If you would like to change your pharmacy for FUTURE prescriptions, please call the office. Pharmacy: [facility]. Pain: You were prescribed a narcotic medication for pain control. Please  use the medication as prescribed. Pain medications may cause constipation  Colace or Milk of Magnesia may be used as needed. Other possible side effects of pain medications are dizziness, headache, nausea, vomiting, and urinary retention. Discontinue the pain medication if you develop itching, rash, shortness of breath, or difficulties swallowing. If these symptoms become severe or arent relieved by discontinuing the medication, you should seek immediate medical attention. You cannot drive or operate machinery while taking narcotic medication. Some pain medications already contain Tylenol/Acetaminophen. Please read your prescription pain medicine bottle prior to taking additional Tylenol. Do not exceed 3000mg of Tylenol/Acetaminophen in a 24 hour period. Refills of pain medication are authorized during office hours only ( Monday to Friday 8am-5pm) 2. Blood Thinner:  You have been given a prescription for Aspirin 325mg. Please take one tablet twice daily for 14 days. Do not take anti-inflammatory medication (Advil, Aleve, Motrin) while taking the blood thinner. 3. Stool Softeners:  Pain medications can cause constipation. Stool softeners, warm prune juice and increasing your water and fiber intake can help prevent constipation. Do not take laxatives. 4. You should resume other medications you were taking prior to your surgery. Pain medication may change the effects of any antidepressant medication you are taking. If you have any questions about possible interactions between your regular medications and the pain medication you should consult the physician who prescribes your regular medications. Physical Therapy: You must begin outpatient physical therapy 2-3 days after your surgery. Your were given a prescription when you scheduled your surgery. If you do not have an appointment scheduled or a therapy prescription please call Dr. Dasha Philip' office immediately. APPOINTMENT: Orthopedic PT 3-21 Follow-Up Appointment: 
Please follow up at your scheduled appointment 10-14 days from the day of your surgery. If you do not already have an appointment, please call our office at [phone] for your follow up appointment. Your appointment will likely be with MYRNA Mcmahan assists Dr. Dasha Philip in surgery and will be able to review your operation and answer your questions. APPOINTMENT: 4-2 at 1:15pm 
 
 
Important Signs and Symptoms: 
If any of the following signs and symptoms occurs, you should contact Dr. Jovan Gomes office. Please be advised if a problem arises which you feel requires immediate medical attention or you are unable to contact Dr. Jovan Gomes office, you should seek immediate medical attention. Signs and symptoms to watch for include: 1. A sudden increase in swelling and/or redness or warmth at the area of your surgery which isnt relieved by rest, ice, and elevation. 2. Oral temperature greater than 101degrees for 12 hours or more which isnt relieved by an increase in fluid intake and taking two Tylenol every 4-6 hours. 3. Excessive drainage from your incisions, or drainage which hasnt stopped by 72 hours after your surgery. 4. Calf pain, ever, chills, shortness of breath, chest pain, nausea, vomiting or other signs and symptoms which are of concern to you. Unless you are having a true medical emergency,  
you MUST call Dr. Liss Martinez' office BEFORE proceeding to the Emergency Department. A provider is on call 24 hours/day. Exercises after Shoulder Surgery 1. Passive Forward Flexion: 
                          
Instructions: 
? Lay on your back ? Take your non surgical arm and grab the wrist of your surgical arm ? Use your non surgical arm to lift your surgical arm over your head with the elbow straight ? Slowly return your arm to your side using your non surgical arm ? Repeat 20 times in the morning and 20 times in the evening Remember: 
- Passive motion: Your arm is lifted with the help of your other hand. o The repair is protected when you do passive motion - Active motion: You lift your arm by using your shoulder muscles. o DO NOT DO ANY ACTIVE MOTION FOR NOW 2. Codman Pendulum Exercises Instructions: 
? Bend forward about 90° at the waist and support yourself on a sturdy object with your non surgical arm  (bend slightly at the knees to protect your back) ? Gently allow your surgical arm to hang towards the ground ? Move your hips forward and backward allowing your arm to swing slightly ? Arm can move forward, backward, and in small circles (clockwise and counterclockwise) o Remember: let your body move your arm, do not use your arm muscles ? Begin performing the exercise for about 30 seconds. Progress to 2 minutes. ? Repeat 2-3 times per day Introducing \[facility] [place] [facility]\"" & HEALTH SERVICES! Dear Enrique Nieto: Thank you for requesting a [employer] account. Our records indicate that you already have an active [employer] account. You can access your account anytime at [email or web address] Did you know that you can access your hospital and ER discharge instructions at any time in [employer]? You can also review all of your test results from your hospital stay or ER visit. Additional Information If you have questions, please visit the Frequently Asked Questions section of the [employer] website at [email or web address] Remember, [employer] is NOT to be used for urgent needs. For medical emergencies, dial 911. Now available from your iPhone and Android! Providers Seen During Your Hospitalization Provider Specialty Primary office phone Mac Dee MD Orthopedic Surgery [phone] Your Primary Care Physician (PCP) Primary Care Physician Office Phone Office Fax [facility] [phone] You are allergic to the following Allergen Reactions Adhesive Other (comments) Has had skin abrasions Codeine Nausea Only Hydrocod-Cpm-Pe-Acetaminophen Nausea Only Not allergic to the tylenol part Recent Documentation Weight Breastfeeding? BMI OB Status Smoking Status 55.8 kg No 20.47 kg/m2 Hysterectomy Never Smoker Emergency Contacts Name Discharge Info Relation Home Work Mobile Sarkis Davis DISCHARGE CAREGIVER [3] Spouse [3] [phone] Patient Belongings The following personal items are in your possession at time of discharge: 
  Dental Appliances: None  Visual Aid: Glasses Please provide this summary of care documentation to your next provider. Signatures-by signing, you are acknowledging that this After Visit Summary has been reviewed with you and you have received a copy. Patient Signature:  ____________________________________________________________ Date:  ____________________________________________________________  
  
[facility] Signature:  ____________________________________________________________ Date:  ____________________________________________________________

## 2018-03-19 NOTE — DISCHARGE INSTRUCTIONS
Shoulder Replacement Surgery Discharge Instructions  Dr. Ora Muhammad    Please take the time to review the following instructions before you leave the hospital and use them as guidelines during your recovery from surgery. If you have any questions, you may contact my office at [phone]. Wound Care / Dressing Change    Do NOT remove your dressing. Keep the clear, plastic dressing intact until your follow up in the office. Showering / Bathing    If the clear plastic dressing is intact and there is no drainage, you may shower. If the dressing is loose or water gets under it please notify our office. If there is drainage, please call the office immediately. Sling    Keep your arm in the immobilizer/sling at all times except when showering, changing your clothes, and doing the exercises shown to you by Dr. Donal Connor or your physical/occupational therapist prior to your discharge from the hospital.  Keep your arm at your side when changing your clothes and showering. Do not move it away from your body. Ice and Elevation    Ice therapy should be used consistently for 48 hours after surgery. Subsequently, you should ice 3 times per day for 20 minutes at a time. After the first week, you may use ice as needed for pain and swelling. Please ensure there is protective barrier between your skin and the ice. Diet    You may advance your regular diet as tolerated. Increase your clear liquid intake for the next 2-3 days. Medications  Your prescriptions were sent to the pharmacy on file. We are unable to change the  narcotic prescription that has already been sent today. If you would like to change your pharmacy for FUTURE prescriptions, please call the office. Pharmacy: [facility]    1. Pain: You were prescribed a narcotic medication for pain control. Please  use the medication as prescribed.   Pain medications may cause constipation - Colace or Milk of Magnesia may be used as needed. Other possible side effects of pain medications are dizziness, headache, nausea, vomiting, and urinary retention. Discontinue the pain medication if you develop itching, rash, shortness of breath, or difficulties swallowing. If these symptoms become severe or arent relieved by discontinuing the medication, you should seek immediate medical attention. You cannot drive or operate machinery while taking narcotic medication. Some pain medications already contain Tylenol/Acetaminophen. Please read your prescription pain medicine bottle prior to taking additional Tylenol. Do not exceed 3000mg of Tylenol/Acetaminophen in a 24 hour period. Refills of pain medication are authorized during office hours only   ( Monday to Friday 8am-5pm)        2. Blood Thinner:  You have been given a prescription for Aspirin 325mg. Please take one tablet twice daily for 14 days. Do not take anti-inflammatory medication (Advil, Aleve, Motrin) while taking the blood thinner. 3. Stool Softeners:  Pain medications can cause constipation. Stool softeners, warm prune juice and increasing your water and fiber intake can help prevent constipation. Do not take laxatives. 4. You should resume other medications you were taking prior to your surgery. Pain medication may change the effects of any antidepressant medication you are taking. If you have any questions about possible interactions between your regular medications and the pain medication you should consult the physician who prescribes your regular medications. Physical Therapy:  You must begin outpatient physical therapy 2-3 days after your surgery. Your were given a prescription when you scheduled your surgery. If you do not have an appointment scheduled or a therapy prescription please call Dr. Rg Kimball' office immediately.      APPOINTMENT: Orthopedic PT 3-21    Follow-Up Appointment:  Please follow up at your scheduled appointment 10-14 days from the day of your surgery. If you do not already have an appointment, please call our office at [phone] for your follow up appointment. Your appointment will likely be with Mj Simms PA-C. Vanessa Gomez assists Dr. Esau Batres in surgery and will be able to review your operation and answer your questions. APPOINTMENT: 4-2 at 1:15pm      Important Signs and Symptoms:  If any of the following signs and symptoms occurs, you should contact Dr. Maicol Jacobs office. Please be advised if a problem arises which you feel requires immediate medical attention or you are unable to contact Dr. Maicol Jacobs office, you should seek immediate medical attention. Signs and symptoms to watch for include:    1. A sudden increase in swelling and/or redness or warmth at the area of your surgery which isnt relieved by rest, ice, and elevation. 2. Oral temperature greater than 101degrees for 12 hours or more which isnt relieved by an increase in fluid intake and taking two Tylenol every 4-6 hours. 3. Excessive drainage from your incisions, or drainage which hasnt stopped by 72 hours after your surgery. 4. Calf pain, ever, chills, shortness of breath, chest pain, nausea, vomiting or other signs and symptoms which are of concern to you. Unless you are having a true medical emergency,   you MUST call Dr. Esau Batres' office   BEFORE proceeding to the Emergency Department. A provider is on call 24 hours/day. Exercises after Shoulder Surgery  1.  Passive Forward Flexion:                             Instructions:  - Lay on your back  - Take your non surgical arm and grab the wrist of your surgical arm   - Use your non surgical arm to lift your surgical arm over your head with the elbow straight   - Slowly return your arm to your side using your non surgical arm  - Repeat 20 times in the morning and 20 times in the evening  Remember:  - Passive motion: Your arm is lifted with the help of your other hand. o The repair is protected when you do passive motion  - Active motion: You lift your arm by using your shoulder muscles. o DO NOT DO ANY ACTIVE MOTION FOR NOW    2. Codman Pendulum Exercises                                         Instructions:  - Bend forward about 90° at the waist and support yourself on a sturdy object with your non surgical arm  (bend slightly at the knees to protect your back)  - Gently allow your surgical arm to hang towards the ground  - Move your hips forward and backward allowing your arm to swing slightly  - Arm can move forward, backward, and in small circles (clockwise and counterclockwise)  o Remember: let your body move your arm, do not use your arm muscles  - Begin performing the exercise for about 30 seconds. Progress to 2 minutes.   - Repeat 2-3 times per day

## 2018-03-19 NOTE — H&P (VIEW-ONLY)
PAT Pre-Op History & Physical    Patient: Amber Cruz                  MRN: [id number]          SSN: [id number]  YOB: 1935          Age: [de-identified] y.o. Sex: female                Subjective:     Patient is a [de-identified] y.o.  female who presents with history of fracturing her left shoulder in 2012. She feels like after surgery her shoulder never really healed well. She has had several surgeries since that time. Pain is located in the shoulder and down into the bicep. She states she can feel the head of the shoulder joint pressing forward. She has limited ROM to shoulder. Left arm is weaker. She is right hand dominant. Activity makes the pain worse. Rates pain 2/10 to 5/10. The patient was evaluated in the surgeon's office and it was determined that the most appropriate plan of care is to proceed with surgical intervention.   Patient's PCP Kalina Vargas MD      Past Medical History:   Diagnosis Date    Arthritis     Cornea abrasion     Bilateral    Endometrial cancer ([facility] [address] 12/1990    H/O seasonal allergies     Headache(784.0)     Heart disease     Hypercholesteremia     Nausea & vomiting     Skin cancer, basal cell 1999 2001, 2005, 2011                     Scalp, lip and lower leg    Thyroid disease 1980      Past Surgical History:   Procedure Laterality Date    HX FRACTURE TX Left 11/2012    Shoulder    HX HYSTERECTOMY  1990    HX LUMBAR DISKECTOMY N/A 07/2015    HX MALIGNANT SKIN LESION EXCISION N/A 06/1999    Scalp                          Basal Cell    HX MALIGNANT SKIN LESION EXCISION N/A 2001    Nose and Lip- 2001, 2005, 2011                  Basal Cell    HX MALIGNANT SKIN LESION EXCISION Left 09/2017    Lower Leg                            Basal Cell    HX PARATHYROIDECTOMY Right 1980    HX SHOULDER ARTHROSCOPY Left 04/2013    Unsuccessful shaving    HX SHOULDER ARTHROSCOPY Left 02/2014    Capsular release, debridement, open subscapularis repair & lysis of adhesions    HX SHOULDER REPLACEMENT Left 10/2013    HX THYROIDECTOMY Right 03/1980    HX TONSIL AND ADENOIDECTOMY      AS CHILD    HX TUBAL LIGATION  1972      Prior to Admission medications    Medication Sig Start Date End Date Taking? Authorizing Provider   GLUCOSA EDWARD 2KCL/CHONDROITIN EDWARD (GLUCOSAMINE SULF-CHONDROITIN PO) Take 2 Caps by mouth two (2) times a day. 1500mg/1200mg   Yes Historical Provider   risedronate (ACTONEL) 150 mg tablet Take 150 mg by mouth every thirty (30) days. Yes Historical Provider   Calcium-Cholecalciferol, D3, (CALCIUM 600 WITH VITAMIN D3) 600 mg(1,500mg) -400 unit cap Take 1 Tab by mouth daily as needed. Yes Historical Provider   sodium chloride (MAGALY 128) 5 % ophthalmic ointment Administer 1 Drop to both eyes as needed. Yes Historical Provider   cholecalciferol, vitamin D3, (VITAMIN D3) 2,000 unit Tab Take  by mouth daily. Yes Historical Provider   levothyroxine (SYNTHROID) 88 mcg tablet Take  by mouth Daily (before breakfast). Yes Historical Provider   simvastatin (ZOCOR) 20 mg tablet Take  by mouth nightly. Yes Historical Provider   aspirin 81 mg tablet Take 81 mg by mouth nightly.    Yes Historical Provider        Allergies   Allergen Reactions    Adhesive Other (comments)     Has had skin abrasions    Codeine Nausea Only    Hydrocod-Cpm-Pe-Acetaminophen Nausea Only     Not allergic to the tylenol part      Social History   Substance Use Topics    Smoking status: Never Smoker    Smokeless tobacco: Never Used    Alcohol use 3.5 oz/week     7 Glasses of wine per week      Comment: 1 WINE NIGHTLY      History   Drug Use No     Family History   Problem Relation Age of Onset    Stroke Mother     Heart Disease Father     Asthma Maternal Grandmother     Heart Disease Maternal Grandmother     No Known Problems Sister     Cancer Maternal Aunt      breast    Parkinsonism Maternal Uncle     Malignant Hyperthermia Neg Hx     Pseudocholinesterase Deficiency Neg Hx     Delayed Awakening Neg Hx     Post-op Nausea/Vomiting Neg Hx     Post-op Cognitive Dysfunction Neg Hx     Emergence Delirium Neg Hx          Review of Systems    Patient denies difficulty swallowing, mouth sores, or loose teeth. Patient denies any recent dental procedures or any planned prior to surgery. Patient denies chest pain, tightness or palpitations. Denies dizziness, visual disturbances, or lightheadedness. Patient denies shortness of breath, wheezing, cough, fever, or chills. Patient denies diarrhea, constipation, or abdominal pain. Patient denies urinary problems including dysuria, hesitancy, urgency, or incontinence. Denies skin breakdown, rashes, insect bites or open area. Objective:     Vital Signs: 98.4, 81, 20, 96%, 121/60    Body mass index is 20.47 kg/(m^2). Wt Readings from Last 1 Encounters:   03/05/18 55.8 kg (123 lb)        Physical Exam:     General: Pleasant,  cooperative, no apparent distress, appears stated age. Eyes: Conjunctivae/corneas clear. EOMs intact. Nose: Nares normal.   Mouth/Throat: Lips, mucosa, and tongue normal. Teeth and gums normal.   Lungs: Clear to auscultation bilaterally. Heart: Regular rate and rhythm, S1, S2 normal. No murmur, click, rub or gallop. Abdomen: Soft, non-tender. Bowel sounds normal. No distention. Musculoskeletal:  Limited ROM to left shoulder,  strong. Extremities:  Extremities normal, atraumatic, no cyanosis or edema. Calves                                 supple, non tender to palpation. Pulses: 2+ and symmetric bilateral upper extremities. Cap. refill <2 seconds   Skin: Skin color, texture, turgor normal. No visible open areas, examined fully clothed   Neurologic: CN II-XII grossly intact. Alert and oriented x3.     Labs:   Recent Results (from the past 72 hour(s))   CBC WITH AUTOMATED DIFF    Collection Time: 03/05/18  8:29 AM   Result Value Ref Range    WBC 4.8 3.6 - 11.0 K/uL    RBC 4.49 3.80 - 5.20 M/uL    HGB 13.7 11.5 - 16.0 g/dL    HCT 42.5 35.0 - 47.0 %    MCV 94.7 80.0 - 99.0 FL    MCH 30.5 26.0 - 34.0 PG    MCHC 32.2 30.0 - 36.5 g/dL    RDW 12.7 11.5 - 14.5 %    PLATELET 231 536 - 541 K/uL    MPV 11.4 8.9 - 12.9 FL    NRBC 0.0 0  WBC    ABSOLUTE NRBC 0.00 0.00 - 0.01 K/uL    NEUTROPHILS 63 32 - 75 %    LYMPHOCYTES 22 12 - 49 %    MONOCYTES 10 5 - 13 %    EOSINOPHILS 5 0 - 7 %    BASOPHILS 0 0 - 1 %    IMMATURE GRANULOCYTES 0 0.0 - 0.5 %    ABS. NEUTROPHILS 3.0 1.8 - 8.0 K/UL    ABS. LYMPHOCYTES 1.1 0.8 - 3.5 K/UL    ABS. MONOCYTES 0.5 0.0 - 1.0 K/UL    ABS. EOSINOPHILS 0.2 0.0 - 0.4 K/UL    ABS. BASOPHILS 0.0 0.0 - 0.1 K/UL    ABS. IMM. GRANS. 0.0 0.00 - 0.04 K/UL    DF AUTOMATED     METABOLIC PANEL, COMPREHENSIVE    Collection Time: 03/05/18  8:29 AM   Result Value Ref Range    Sodium 144 136 - 145 mmol/L    Potassium 4.5 3.5 - 5.1 mmol/L    Chloride 106 97 - 108 mmol/L    CO2 29 21 - 32 mmol/L    Anion gap 9 5 - 15 mmol/L    Glucose 87 65 - 100 mg/dL    BUN 17 6 - 20 MG/DL    Creatinine 0.59 0.55 - 1.02 MG/DL    BUN/Creatinine ratio 29 (H) 12 - 20      GFR est AA >60 >60 ml/min/1.73m2    GFR est non-AA >60 >60 ml/min/1.73m2    Calcium 9.4 8.5 - 10.1 MG/DL    Bilirubin, total 0.3 0.2 - 1.0 MG/DL    ALT (SGPT) 32 12 - 78 U/L    AST (SGOT) 22 15 - 37 U/L    Alk.  phosphatase 44 (L) 45 - 117 U/L    Protein, total 6.9 6.4 - 8.2 g/dL    Albumin 3.7 3.5 - 5.0 g/dL    Globulin 3.2 2.0 - 4.0 g/dL    A-G Ratio 1.2 1.1 - 2.2     HEMOGLOBIN A1C WITH EAG    Collection Time: 03/05/18  8:29 AM   Result Value Ref Range    Hemoglobin A1c 5.3 4.2 - 6.3 %    Est. average glucose 105 mg/dL   PROTHROMBIN TIME + INR    Collection Time: 03/05/18  8:29 AM   Result Value Ref Range    INR 1.0 0.9 - 1.1      Prothrombin time 10.2 9.0 - 11.1 sec   PTT    Collection Time: 03/05/18  8:29 AM   Result Value Ref Range    aPTT 27.1 22.1 - 32.0 sec    aPTT, therapeutic range     58.0 - 77.0 SECS   URINALYSIS W/ REFLEX CULTURE    Collection Time: 03/05/18  8:29 AM   Result Value Ref Range    Color YELLOW/STRAW      Appearance CLEAR CLEAR      Specific gravity 1.018 1.003 - 1.030      pH (UA) 6.0 5.0 - 8.0      Protein NEGATIVE  NEG mg/dL    Glucose NEGATIVE  NEG mg/dL    Ketone NEGATIVE  NEG mg/dL    Bilirubin NEGATIVE  NEG      Blood NEGATIVE  NEG      Urobilinogen 0.2 0.2 - 1.0 EU/dL    Nitrites NEGATIVE  NEG      Leukocyte Esterase NEGATIVE  NEG      WBC 0-4 0 - 4 /hpf    RBC 0-5 0 - 5 /hpf    Epithelial cells FEW FEW /lpf    Bacteria NEGATIVE  NEG /hpf    UA:UC IF INDICATED CULTURE NOT INDICATED BY UA RESULT CNI      Hyaline cast 0-2 0 - 5 /lpf   TYPE & SCREEN    Collection Time: 03/05/18  8:29 AM   Result Value Ref Range    Crossmatch Expiration 03/19/2018     ABO/Rh(D) A POSITIVE     Antibody screen NEG    EKG, 12 LEAD, INITIAL    Collection Time: 03/05/18  9:09 AM   Result Value Ref Range    Ventricular Rate 68 BPM    Atrial Rate 68 BPM    P-R Interval 146 ms    QRS Duration 88 ms    Q-T Interval 376 ms    QTC Calculation (Bezet) 399 ms    Calculated P Axis 79 degrees    Calculated R Axis 73 degrees    Calculated T Axis 60 degrees    Diagnosis       Normal sinus rhythm with sinus arrhythmia  Possible Left atrial enlargement  Left ventricular hypertrophy  Nonspecific ST abnormality  Abnormal ECG    Confirmed by Chuck Streeter ([id number]) on 3/5/2018 5:16:13 PM         Assessment:     Painful Hardware    Plan:     Scheduled for REVISION LEFT TOTAL SHOULDER ARTHROPLASTY TO REVERSE TOTAL SHOULDER ARTHROPLASTY     All labs reviewed and unremarkable. EKG reviewed. MRSA pending. Cardiac clearance was obtained by patient in January 2018. Clearance reviewed and placed on chart.      Moose Castro NP

## 2018-03-19 NOTE — PROGRESS NOTES
3/19/2018  2:59 PM  CM met with pt for assessment. Demographics and PCP were confirmed. Pt is a [de-identified]year old,  female who lives in a private residence with her  (4 exterior, 1 flight interior). PTA, pt was able to complete ADLs without the use of DME. Pt has prescription drug coverage, and prefers [facility] [place] [id number]. Pt was aware of her scheduled outpatient PT [facility] on 3/21/18 at 9:15am. No additional discharge service needs indicated. CM will continue to monitor for finalized discharge recommendations. Beck Millan MA    Care Management Interventions  PCP Verified by CM: Yes Aline Given)  Palliative Care Criteria Met (RRAT>21 & CHF Dx)?: No  Mode of Transport at Discharge:  Other (see comment) ()  MyChart Signup: No  Discharge Durable Medical Equipment: No  Physical Therapy Consult: Yes  Occupational Therapy Consult: Yes  Speech Therapy Consult: No  Current Support Network: Lives with Spouse  Confirm Follow Up Transport: Family  Plan discussed with Pt/Family/Caregiver: Yes  Discharge Location  Discharge Placement: Home with outpatient services

## 2018-03-19 NOTE — CONSULTS
[address] [facility] [address] [place] [address]   Office [phone]  Fax [phone]       Initial Consult Note     Name: David Don MRN: [id number]  Sex: female    YOB: 1935  Age: [de-identified] y.o. PCP: Galileo Dai MD     Date of admission:    3/19/2018  Date of consultation:   3/19/2018  Requesting physician:   Maliha Ba MD  Reason for consultation:   Medical Management    History of present illness  David Don is a [de-identified] y.o. female who is POD 0  after L total shoulder arthroplasty. Had tachycardia to 109 postoperatively, now 92-99. Pt is pleasant, conversant. States she is doing well, sleepy, finished surgery just over an hour ago. Denies N/V, HA, dizziness, fever, chills, abdominal pain. Denies any anxiety, chest pain, SOB, or heart palpitations. Denies any pain at all. No complaints at this time. Is sitting up in bed eating soup,  at bedside. Home Medications   Prior to Admission medications    Medication Sig Start Date End Date Taking? Authorizing Provider   sodium chloride (MAGALY 128) 5 % ophthalmic ointment Administer 1 Drop to both eyes as needed. Yes Historical Provider   cholecalciferol, vitamin D3, (VITAMIN D3) 2,000 unit Tab Take  by mouth daily. Yes Historical Provider   levothyroxine (SYNTHROID) 88 mcg tablet Take  by mouth Daily (before breakfast). Yes Historical Provider   GLUCOSA EDWARD 2KCL/CHONDROITIN EDWARD (GLUCOSAMINE SULF-CHONDROITIN PO) Take 1 Cap by mouth two (2) times a day. 1500mg/1200mg     Historical Provider   risedronate (ACTONEL) 150 mg tablet Take 150 mg by mouth every thirty (30) days. Historical Provider   Calcium-Cholecalciferol, D3, (CALCIUM 600 WITH VITAMIN D3) 600 mg(1,500mg) -400 unit cap Take 1 Tab by mouth daily as needed. Historical Provider   simvastatin (ZOCOR) 20 mg tablet Take  by mouth nightly. Historical Provider   aspirin 81 mg tablet Take 81 mg by mouth nightly.     Historical Provider       Allergies   Allergies Allergen Reactions    Adhesive Other (comments)     Has had skin abrasions    Codeine Nausea Only    Hydrocod-Cpm-Pe-Acetaminophen Nausea Only     Not allergic to the tylenol part       Past Medical History   Past Medical History:   Diagnosis Date    Arthritis     Cornea abrasion     Bilateral    Endometrial cancer ([facility] [address] 12/1990    H/O seasonal allergies     Headache(784.0)     Heart disease     Hypercholesteremia     Nausea & vomiting     Skin cancer, basal cell 1999 2001, 2005, 2011                     Scalp, lip and lower leg    Thyroid disease 1980       Past Surgical History  Past Surgical History:   Procedure Laterality Date    HX FRACTURE TX Left 11/2012    Shoulder    HX HYSTERECTOMY  1990    HX LUMBAR DISKECTOMY N/A 07/2015    HX MALIGNANT SKIN LESION EXCISION N/A 06/1999    Scalp                          Basal Cell    HX MALIGNANT SKIN LESION EXCISION N/A 2001    Nose and Lip- 2001, 2005, 2011                  Basal Cell    HX MALIGNANT SKIN LESION EXCISION Left 09/2017    Lower Leg                            Basal Cell    HX PARATHYROIDECTOMY Right 1980    HX SHOULDER ARTHROSCOPY Left 04/2013    Unsuccessful shaving    HX SHOULDER ARTHROSCOPY Left 02/2014    Capsular release, debridement, open subscapularis repair & lysis of adhesions    HX SHOULDER REPLACEMENT Left 10/2013    HX THYROIDECTOMY Right 03/1980    HX TONSIL AND ADENOIDECTOMY      AS CHILD    HX TUBAL LIGATION  1972       Family History  Family History   Problem Relation Age of Onset    Stroke Mother     Heart Disease Father     Asthma Maternal Grandmother     Heart Disease Maternal Grandmother     No Known Problems Sister     Cancer Maternal Aunt      breast    Parkinsonism Maternal Uncle     Malignant Hyperthermia Neg Hx     Pseudocholinesterase Deficiency Neg Hx     Delayed Awakening Neg Hx     Post-op Nausea/Vomiting Neg Hx     Post-op Cognitive Dysfunction Neg Hx     Emergence Delirium Neg Hx Social History   Living arrangements: patient lives with their spouse. Ambulates: Independently     Alcohol history: 1 glass of wine w dinner ever evening    Smoking history: non-smoker    Illicit drug history: no history of illicit drug use    Sexual history: heterosexual    Review of Systems  Review of Systems   Constitutional: Negative for activity change, chills, fatigue and fever. Respiratory: Negative for cough and shortness of breath. Cardiovascular: Negative for chest pain and leg swelling. Gastrointestinal: Negative for abdominal pain, constipation, diarrhea and nausea. Musculoskeletal: Negative for back pain and joint swelling. Neurological: Negative for dizziness, light-headedness and headaches. Physical Exam  Objective:  General Appearance:  Comfortable and well-appearing. Vital signs: (most recent): Blood pressure 114/55, pulse 99, temperature 97.7 °F (36.5 °C), resp. rate 16, weight 123 lb (55.8 kg), SpO2 95 %. No fever. HEENT: Normal HEENT exam.    Lungs:  Normal respiratory rate and normal effort. She is not in respiratory distress. Breath sounds clear to auscultation. No rales. Heart: Normal rate. Regular rhythm. No murmur. Extremities: There is no dependent edema.  (L shoulder in sling)  Abdomen: Abdomen is soft and non-distended. Bowel sounds are normal.   There is no abdominal tenderness. O2 Flow Rate (L/min): 2 l/min O2 Device: Nasal cannula     Laboratory Data  None. Imaging:  None         Impression / Recommendations     Lidia Rollins is a [de-identified] y.o. female with hx of hypothyroidism s/p thyroidectomy, HLD, arthritis who is s/p L total shoulder arthroplasty. The Family Medicine Service was consulted for medical management. S/p L total shoulder arthroplasty 3/19/18 - POD0  - Pain regimen per Ortho    Hypothyroidism - unable to see last records as PCP not in [facility]. Patient reportedly compliant with home medications.    - Continue home levothyroxine 88mcg    HLD - Continue home simvastatin 20mg qhs    Increased O2 requirements - on 2L NC in the immediately postop period, attempt to wean as tolerated. Patient not on any home oxygen. FEN/GI - Per primary team.   Activity - Per primary team  DVT prophylaxis - SCD's  GI prophylaxis - Not indicated at this time  Disposition - Plan to d/c to Per primary team.  Code Status - Full, discussed with patient / caregivers. Thank you very much for allowing us to participate in the care of this pleasant patient. The family medicine service will continue to follow the patient's medical progress along with you. Please do not hesitate to page with any questions or to discuss the case (Team phone [phone]). Patient will be discussed with Dr. Shukri Bales by:     Eda Rinne, MD  Family Medicine Resident        For Billing    No chief complaint on file. Hospital Problems  Date Reviewed: 4/18/2013          Codes Class Noted POA    * (Principal)Painful orthopaedic hardware [facility]) ICD-10-CM: D26.62DE  ICD-9-CM: 996.78  3/19/2018 Yes        Acquired hypothyroidism (Chronic) ICD-10-CM: E03.9  ICD-9-CM: 244.9  3/19/2018 Yes        Hypercholesteremia (Chronic) ICD-10-CM: E78.00  ICD-9-CM: 272.0  3/19/2018 Yes        Heart disease (Chronic) ICD-10-CM: I51.9  ICD-9-CM: 429.9  3/19/2018 Yes        Cornea abrasion (Chronic) ICD-10-CM: S05. 00XA  ICD-9-CM: 918.1  3/19/2018 Yes    Overview Signed 3/19/2018  1:30 PM by Elodia Barajas MD     Bilateral             Primary localized osteoarthrosis of shoulder region ICD-10-CM: M19.019  ICD-9-CM: 715.11  4/17/2013 Yes

## 2018-03-19 NOTE — ADDENDUM NOTE
Addendum  created 03/19/18 1252 by Mac Do CRNA    Anesthesia Intra Flowsheets edited, Anesthesia Intra Meds edited

## 2018-03-19 NOTE — PERIOP NOTES
TRANSFER - OUT REPORT:    Verbal report given to RN CARRIE( on Lidia Rollins  being transferred to [facility] for routine post - op       Report consisted of patients Situation, Background, Assessment and   Recommendations(SBAR). Information from the following report(s) SBAR, OR Summary, Procedure Summary, Intake/Output and MAR was reviewed with the receiving nurse. Lines:   Peripheral IV 03/19/18 Right Hand (Active)   Site Assessment Clean, dry, & intact 3/19/2018 12:15 PM   Phlebitis Assessment 0 3/19/2018 12:15 PM   Infiltration Assessment 0 3/19/2018 12:15 PM   Dressing Status Clean, dry, & intact; Occlusive 3/19/2018 12:15 PM   Dressing Type Tape;Transparent 3/19/2018 12:15 PM   Hub Color/Line Status Pink; Infusing 3/19/2018 12:15 PM   Alcohol Cap Used Yes 3/19/2018 12:15 PM        Opportunity for questions and clarification was provided.       Patient transported with:   O2 @ 2 liters  Registered Nurse

## 2018-03-19 NOTE — IP AVS SNAPSHOT
[address] 
[phone] Patient: David Don MRN: [id number] :1935 A check bryant indicates which time of day the medication should be taken. My Medications START taking these medications Instructions Each Dose to Equal  
 Morning Noon Evening Bedtime HYDROmorphone 2 mg tablet Commonly known as:  DILAUDID Take 1 Tab by mouth every four (4) hours as needed for Pain. Max Daily Amount: 12 mg.  
 2 mg CHANGE how you take these medications Instructions Each Dose to Equal  
 Morning Noon Evening Bedtime  
 aspirin 325 mg tablet Commonly known as:  ASPIRIN What changed:   
- medication strength 
- how much to take - when to take this 
- additional instructions Your last dose was:  930 am  
Your next dose is:  600pm  
   
 Take 1 Tab by mouth every twelve (12) hours for 14 days. Take with food 325 mg  
    
930 CONTINUE taking these medications Instructions Each Dose to Equal  
 Morning Noon Evening Bedtime CALCIUM 600 WITH VITAMIN D3 600 mg(1,500mg) -400 unit Cap Generic drug:  Calcium-Cholecalciferol (D3) Take 1 Tab by mouth daily as needed. 1 Tab GLUCOSAMINE SULF-CHONDROITIN PO Take 1 Cap by mouth two (2) times a day. 1500mg/1200mg 1 Cap  
    
   
   
   
  
 levothyroxine 88 mcg tablet Commonly known as:  SYNTHROID Your last dose was:  0704 am  
   
 Take  by mouth Daily (before breakfast). 0704 am  
   
   
   
  
 MAGALY 128 5 % ophthalmic ointment Generic drug:  sodium chloride Administer 1 Drop to both eyes as needed. 1 Drop  
    
   
   
   
  
 risedronate 150 mg tablet Commonly known as:  ACTONEL Take 150 mg by mouth every thirty (30) days. 150 mg  
    
   
   
   
  
 simvastatin 20 mg tablet Commonly known as:  ZOCOR  
   
 Take  by mouth nightly. VITAMIN D3 2,000 unit Tab Generic drug:  cholecalciferol (vitamin D3) Take  by mouth daily. Where to Get Your Medications Information on where to get these meds will be given to you by the nurse or doctor. ! Ask your nurse or doctor about these medications  
  aspirin 325 mg tablet HYDROmorphone 2 mg tablet

## 2018-03-19 NOTE — INTERVAL H&P NOTE
H&P Update:  Marilee Morgan was seen and examined. History and physical has been reviewed. The patient has been examined.  There have been no significant clinical changes since the completion of the originally dated History and Physical.    Signed By: Kristi Klein MD     March 19, 2018 7:30 AM

## 2018-03-19 NOTE — ANESTHESIA PROCEDURE NOTES
Peripheral Block    Start time: 3/19/2018 7:33 AM  End time: 3/19/2018 7:43 AM  Performed by: Malvin Webb  Authorized by: Malvin Webb       Pre-procedure: Indications: at surgeon's request and post-op pain management    Preanesthetic Checklist: patient identified, risks and benefits discussed, site marked, timeout performed, anesthesia consent given and patient being monitored    Timeout Time: 07:33          Block Type:   Block Type:   Interscalene  Laterality:  Left  Monitoring:  Continuous pulse ox, frequent vital sign checks, heart rate, responsive to questions and oxygen  Injection Technique:  Continuous  Procedures: ultrasound guided    Patient Position: supine  Prep: chlorhexidine    Location:  Interscalene  Needle Type:  Tuohy  Needle Gauge:  18 G  Needle Localization:  Nerve stimulator and ultrasound guidance  Medication Injected:  0.5%  ropivacaine  Volume (mL):  30    Assessment:  Number of attempts:  1  Injection Assessment:  Incremental injection every 5 mL, local visualized surrounding nerve on ultrasound, negative aspiration for blood and no intravascular symptoms  Patient tolerance:  Patient tolerated the procedure well with no immediate complications

## 2018-03-19 NOTE — ANESTHESIA PREPROCEDURE EVALUATION
Anesthetic History     PONV          Review of Systems / Medical History  Patient summary reviewed, nursing notes reviewed and pertinent labs reviewed    Pulmonary  Within defined limits                 Neuro/Psych   Within defined limits           Cardiovascular  Within defined limits                Exercise tolerance: >4 METS     GI/Hepatic/Renal  Within defined limits              Endo/Other      Hypothyroidism  Arthritis     Other Findings              Physical Exam    Airway  Mallampati: I  TM Distance: 4 - 6 cm  Neck ROM: normal range of motion   Mouth opening: Normal     Cardiovascular    Rhythm: regular  Rate: normal         Dental    Dentition: Lower dentition intact and Upper dentition intact     Pulmonary  Breath sounds clear to auscultation               Abdominal  GI exam deferred       Other Findings            Anesthetic Plan    ASA: 2  Anesthesia type: general and regional - interscalene block      Post-op pain plan if not by surgeon: peripheral nerve block continuous    Induction: Intravenous  Anesthetic plan and risks discussed with: Patient

## 2018-03-19 NOTE — ANESTHESIA POSTPROCEDURE EVALUATION
Post-Anesthesia Evaluation and Assessment    Patient: Dana Rutledge MRN: [id number]  SSN: [id number]    YOB: 1935  Age: [de-identified] y.o. Sex: female       Cardiovascular Function/Vital Signs  Visit Vitals    /60    Pulse 92    Temp 36.6 °C (97.8 °F)    Resp 15    SpO2 97%       Patient is status post general, regional anesthesia for Procedure(s):  REVISION LEFT TOTAL SHOULDER ARTHROPLASTY TO REVERSE TOTAL,SHOULDER ARTHROPLASTY, BICEPS TENDOESIS, AND AXILLARY NERVE DISSECTION (GEN WITH BLOCK). Nausea/Vomiting: None    Postoperative hydration reviewed and adequate. Pain:  Pain Scale 1: Numeric (0 - 10) (03/19/18 1210)  Pain Intensity 1: 0 (03/19/18 1210)   Managed    Neurological Status:   Neuro (WDL): Exceptions to WDL (03/19/18 1210)  Neuro  Neurologic State: Alert;Eyes open spontaneously (03/19/18 1210)  Orientation Level: Oriented X4 (03/19/18 1210)  Cognition: Follows commands (03/19/18 1210)  Speech: Clear (03/19/18 1210)  LUE Motor Response:  unequal R greater than L;Moderate;Nonpurposeful;Numbness; Pharmacologically paralyzed;Weak (03/19/18 1210)  LLE Motor Response: Purposeful (03/19/18 1210)  RUE Motor Response: Purposeful (03/19/18 1210)  RLE Motor Response: Purposeful (03/19/18 1210)   At baseline    Mental Status and Level of Consciousness: Arousable    Pulmonary Status:   O2 Device: Nasal cannula (03/19/18 1210)   Adequate oxygenation and airway patent    Complications related to anesthesia: None    Post-anesthesia assessment completed.  No concerns    Signed By: Bereket Lynch MD     March 19, 2018

## 2018-03-20 PROCEDURE — 74011250637 HC RX REV CODE- 250/637: Performed by: FAMILY MEDICINE

## 2018-03-20 PROCEDURE — 97161 PT EVAL LOW COMPLEX 20 MIN: CPT

## 2018-03-20 PROCEDURE — 74011250637 HC RX REV CODE- 250/637: Performed by: ORTHOPAEDIC SURGERY

## 2018-03-20 PROCEDURE — 97165 OT EVAL LOW COMPLEX 30 MIN: CPT

## 2018-03-20 PROCEDURE — 97535 SELF CARE MNGMENT TRAINING: CPT

## 2018-03-20 PROCEDURE — 74011250636 HC RX REV CODE- 250/636: Performed by: ORTHOPAEDIC SURGERY

## 2018-03-20 RX ORDER — HYDROMORPHONE HYDROCHLORIDE 2 MG/1
2 TABLET ORAL
Status: DISCONTINUED | OUTPATIENT
Start: 2018-03-20 | End: 2018-03-20 | Stop reason: HOSPADM

## 2018-03-20 RX ORDER — HYDROMORPHONE HYDROCHLORIDE 2 MG/1
2 TABLET ORAL
Qty: 75 TAB | Refills: 0 | Status: SHIPPED
Start: 2018-03-20

## 2018-03-20 RX ORDER — ASPIRIN 325 MG
325 TABLET ORAL EVERY 12 HOURS
Qty: 28 TAB | Refills: 0 | Status: SHIPPED
Start: 2018-03-20 | End: 2018-04-03

## 2018-03-20 RX ADMIN — FAMOTIDINE 20 MG: 20 TABLET, FILM COATED ORAL at 09:32

## 2018-03-20 RX ADMIN — DOCUSATE SODIUM AND SENNOSIDES 1 TABLET: 8.6; 5 TABLET, FILM COATED ORAL at 09:30

## 2018-03-20 RX ADMIN — LEVOTHYROXINE SODIUM 88 MCG: 88 TABLET ORAL at 07:04

## 2018-03-20 RX ADMIN — Medication 10 ML: at 07:04

## 2018-03-20 RX ADMIN — SODIUM CHLORIDE 125 ML/HR: 900 INJECTION, SOLUTION INTRAVENOUS at 03:23

## 2018-03-20 RX ADMIN — ASPIRIN 325 MG: 325 TABLET, DELAYED RELEASE ORAL at 09:33

## 2018-03-20 RX ADMIN — POLYETHYLENE GLYCOL 3350 17 G: 17 POWDER, FOR SOLUTION ORAL at 09:29

## 2018-03-20 RX ADMIN — Medication 2 G: at 07:04

## 2018-03-20 NOTE — PROGRESS NOTES
3/20/2018  10:15 AM  Pt discharge noted. CM reviewed EMR. No discharge service needs indicated. Pt's  will provide transport upon discharge.

## 2018-03-20 NOTE — PROGRESS NOTES
Occupational Therapy EVALUATION/discharge  Patient: Stevie Aponte [de-identified] y.o. female)  Date: 3/20/2018  Primary Diagnosis: PAINFUL HARDWARE LEFT SHOULDER  Painful orthopaedic hardware ([address]  Procedure(s) (LRB):  REVISION LEFT TOTAL SHOULDER ARTHROPLASTY TO REVERSE TOTAL,SHOULDER ARTHROPLASTY, BICEPS TENDOESIS, AND AXILLARY NERVE DISSECTION (GEN WITH BLOCK) (Left) 1 Day Post-Op   Precautions:   NWB (NWB LUE, falls)    ASSESSMENT:   Based on the objective data described below, the patient presents with hospital admission for revision of left total shoulder arthoplasty to reverse total shoulder arthoplasty. Patient received supine in bed, requesting OOB activity for toileting patient able to perform bed mobility with supervision, CGA for transfer to commode and manage hygiene with CGA. Patient able to recall many of safe precautions following previous surgery. Patient requires min assist for donning sling and UE bathing/dressing tasks and spouse will assist at home. Patient able to participate in UE exercises as indicated by MD and follows precautions throughout. Patient safe to discharge home from OT standpoint. Further skilled acute occupational therapy is not indicated at this time. Discharge Recommendations: Outpatient  Further Equipment Recommendations for Discharge: none       SUBJECTIVE:   Patient stated I dont have any pain.     OBJECTIVE DATA SUMMARY:   HISTORY:   Past Medical History:   Diagnosis Date    Arthritis     Cornea abrasion     Bilateral    Endometrial cancer ([address] 12/1990    H/O seasonal allergies     Headache(784.0)     Heart disease     Hypercholesteremia     Nausea & vomiting     Skin cancer, basal cell 1999 2001, 2005, 2011                     Scalp, lip and lower leg    Thyroid disease 1980     Past Surgical History:   Procedure Laterality Date    HX FRACTURE TX Left 11/2012    Shoulder    HX HYSTERECTOMY  1990    HX LUMBAR DISKECTOMY N/A 07/2015    HX MALIGNANT SKIN LESION EXCISION N/A 06/1999    Scalp                          Basal Cell    HX MALIGNANT SKIN LESION EXCISION N/A 2001    Nose and Lip- 2001, 2005, 2011                  Basal Cell    HX MALIGNANT SKIN LESION EXCISION Left 09/2017    Lower Leg                            Basal Cell    HX PARATHYROIDECTOMY Right 1980    HX SHOULDER ARTHROSCOPY Left 04/2013    Unsuccessful shaving    HX SHOULDER ARTHROSCOPY Left 02/2014    Capsular release, debridement, open subscapularis repair & lysis of adhesions    HX SHOULDER REPLACEMENT Left 10/2013    HX THYROIDECTOMY Right 03/1980    HX TONSIL AND ADENOIDECTOMY      AS CHILD    HX TUBAL LIGATION  1972       Prior Level of Function/Environment/Context: independent   Occupations in which the patient is/was successful, what are the barriers preventing that success:   Performance Patterns (routines, roles, habits, and rituals):   Personal Interests and/or values:   Expanded or extensive additional review of patient history:     Home Situation  Home Environment: Private residence  # Steps to Enter: 5  Rails to Enter: Yes  Hand Rails : Right  One/Two Story Residence: [facility] level  # of Interior Steps: 7  Height of Each Step (in): 0 inches  Interior Rails: Right  Lift Chair Available: No  Living Alone: No  Support Systems: Spouse/Significant Other/Partner  Patient Expects to be Discharged to[de-identified] Private residence  Current DME Used/Available at Home: Blood pressure cuff, Walker, rolling, Shower chair, Cane, straight, Crutches  Tub or Shower Type: Shower  [x]  Right hand dominant   []  Left hand dominant    EXAMINATION OF PERFORMANCE DEFICITS:  Cognitive/Behavioral Status:  Neurologic State: Alert  Orientation Level: Oriented X4  Cognition: Appropriate decision making; Follows commands  Perception: Appears intact  Perseveration: No perseveration noted  Safety/Judgement: Awareness of environment    Skin: intact as seen    Edema: LUE     Hearing:   Auditory  Auditory Impairment: None    Vision/Perceptual:                                Corrective Lenses: Glasses    Range of Motion:  AROM: Within functional limits (BLE)   WFL RUE, LUE all except shoulder                       Strength:    Strength: Within functional limits (BLE)                Coordination:                Tone & Sensation:  Tone: Normal  Sensation: Intact (BLE)                      Balance:  Sitting: Intact  Standing: Intact    Functional Mobility and Transfers for ADLs:  Bed Mobility:  Supine to Sit: Supervision    Transfers:  Sit to Stand: Contact guard assistance  Stand to Sit: Supervision  Bed to Chair: Contact guard assistance  Toilet Transfer : Contact guard assistance    ADL Assessment:  Feeding: Supervision    Oral Facial Hygiene/Grooming: Supervision    Bathing: Minimum assistance    Upper Body Dressing: Minimum assistance    Lower Body Dressing: Minimum assistance    Toileting: Contact guard assistance                ADL Intervention and task modifications:                                     Cognitive Retraining  Safety/Judgement: Awareness of environment    Therapeutic Exercise:    (Days 1 to 3) Exercises:      EXERCISE   Sets   Reps   Active Active Assist   Passive   Comments   Elbow Flexion/ extesnsion 1 5 []           [x]           []            2x day   Wrist flexion/ extension 1 10 [x]           []           []            2x day    Hand flexion/ extension 1 10 [x]           []           []            2x day    Supine passive forward elevation  1 3 []           []           [x]            2x day; plane of scapula (PFE) to  Tolerance; Supine PFE by family member or using opposite arm      Codman's pendulum 1 10 []           []           [x]            clockwise/counter clockwise    C-spine AROM 1 5 [x]           []           []            rotation, flexion, + lateral flexion     Cautions:  - Assure normal neurovascular status  - No lifting of involved arm  - Shoulder extension is limited; Elbow not to go behind midline of body  - Protect the subscapularis repair    Goals:  -Maintain stable prosthesis  -Minimize pain and inflammatory response  -Achieve staged ROM goals  -Establish stable scapula  -Initiate pain free rotator cuff and deltoid strengthening       Functional Measure:  Barthel Index:    Bathin  Bladder: 10  Bowels: 10  Groomin  Dressin  Feeding: 10  Mobility: 15  Stairs: 5  Toilet Use: 10  Transfer (Bed to Chair and Back): 10  Total: 80       Barthel and G-code impairment scale:  Percentage of impairment CH  0% CI  1-19% CJ  20-39% CK  40-59% CL  60-79% CM  80-99% CN  100%   Barthel Score 0-100 100 99-80 79-60 59-40 20-39 1-19   0   Barthel Score 0-20 20 17-19 13-16 9-12 5-8 1-4 0      The Barthel ADL Index: Guidelines  1. The index should be used as a record of what a patient does, not as a record of what a patient could do. 2. The main aim is to establish degree of independence from any help, physical or verbal, however minor and for whatever reason. 3. The need for supervision renders the patient not independent. 4. A patient's performance should be established using the best available evidence. Asking the patient, friends/relatives and nurses are the usual sources, but direct observation and common sense are also important. However direct testing is not needed. 5. Usually the patient's performance over the preceding 24-48 hours is important, but occasionally longer periods will be relevant. 6. Middle categories imply that the patient supplies over 50 per cent of the effort. 7. Use of aids to be independent is allowed. Sergey Mccloud., Barthel, D.W. ([id number]). Functional evaluation: the Barthel Index. [facility] (14)2. SHERI Nunez, Soumya Wellington., Klaudia Briscoe., [place], [address] (). Measuring the change indisability after inpatient rehabilitation; comparison of the responsiveness of the Barthel Index and Functional [place] Measure.  Journal of Neurology, Neurosurgery, and Psychiatry, 66(4), 104-318. NondRAHEEM De La Cruz, JULIA Rasmussen, & Torri Bowie M.A. (2004.) Assessment of post-stroke quality of life in cost-effectiveness studies: The usefulness of the Barthel Index and the EuroQoL-5D. Quality of Life Research, 13, 360-91         G codes: In compliance with CMSs Claims Based Outcome Reporting, the following G-code set was chosen for this patient based on their primary functional limitation being treated: The outcome measure chosen to determine the severity of the functional limitation was the Barthel Index  with a score of 80/100 which was correlated with the impairment scale. ? Self Care:     - CURRENT STATUS: CI - 1%-19% impaired, limited or restricted    - GOAL STATUS: CI - 1%-19% impaired, limited or restricted    - D/C STATUS:  CI - 1%-19% impaired, limited or restricted     Occupational Therapy Evaluation Charge Determination   History Examination Decision-Making   LOW Complexity : Brief history review  LOW Complexity : 1-3 performance deficits relating to physical, cognitive , or psychosocial skils that result in activity limitations and / or participation restrictions  LOW Complexity : No comorbidities that affect functional and no verbal or physical assistance needed to complete eval tasks       Based on the above components, the patient evaluation is determined to be of the following complexity level: LOW   Pain:  Pain Scale 1: Numeric (0 - 10)  Pain Intensity 1: 0              Activity Tolerance:   VSS  Please refer to the flowsheet for vital signs taken during this treatment.   After treatment:   [x]  Patient left in no apparent distress sitting up in chair  []  Patient left in no apparent distress in bed  [x]  Call bell left within reach  [x]  Nursing notified  [x]  Caregiver present  [x]  Bed alarm activated    COMMUNICATION/EDUCATION:   Communication/Collaboration:  [x]      Home safety education was provided and the patient/caregiver indicated understanding. [x]      Patient/family have participated as able and agree with findings and recommendations. []      Patient is unable to participate in plan of care at this time.   Findings and recommendations were discussed with: Physical Therapist and Registered Nurse    DESI Agustin/L  Time Calculation: 32 mins

## 2018-03-20 NOTE — PROGRESS NOTES
[address] [place] [address]   Office [phone]  Fax [phone]          Assessment and Plan     Mariely Blanton is a [de-identified] y.o. female with hx of hypothyroidism s/p thyroidectomy, HLD, arthritis who is s/p L total shoulder arthroplasty. The Family Medicine Service was consulted for medical management. 24 Hour Events:   - No acute events overnight. Vitals stable. S/p L total shoulder arthroplasty 3/19/18 - POD1  - Pain regimen per Ortho  - Patient medically stable for discharge. Dispo per Ortho.      Hypothyroidism -   - Continue home levothyroxine 88mcg     HLD - Continue home simvastatin 20mg qhs     Increased O2 requirements - on 2L immediately postop  - Now satting well on RA     FEN/GI - Per primary team.   Activity - Per primary team  DVT prophylaxis - SCD's  GI prophylaxis - Not indicated at this time  Disposition - Plan to d/c to Per primary team.  Code Status - Full, discussed with patient / caregivers. I appreciate the opportunity to participate in the care of this patient,    Nobie Goldberg, MD  [facility] Medicine Resident         Subjective / Objective     Subjective    Patient sitting up comfortably in bed, denies any pain. Denies N/V, abdominal pain. Ate dinner okay, no issues. No new concerns today. On room air. Objective  Respiratory: O2 Flow Rate (L/min): 2 l/min O2 Device: Room air   Visit Vitals    /51 (BP 1 Location: Right arm, BP Patient Position: At rest)    Pulse 81    Temp 98.6 °F (37 °C)    Resp 16    Wt 123 lb (55.8 kg)    SpO2 93%    Breastfeeding No    BMI 20.47 kg/m2       General: No acute distress. Alert. Cooperative. Head: Normocephalic. Atraumatic. Neck: Supple. Normal ROM. No stiffness. Respiratory: CTAB. No w/r/r/c.   Cardiovascular: RRR. Normal S1,S2. No m/r/g. Pulses 2+ throughout. GI: + bowel sounds. Nontender. No rebound tenderness or guarding. Nondistended.    Extremities: L shoulder in sling   Musculoskeletal: Full ROM in all extremities. No LE edema. Distal pulses intact. Skin: Warm, dry. No rashes. Neuro: CN II-XII grossly intact. I/O:    Date 03/19/18 0700 - 03/20/18 0659 03/20/18 0700 - 03/21/18 0659   Shift 2459-4999 2822-6018 24 Hour Total 4242-3831 6612-6471 24 Hour Total   I  N  T  A  K  E   I.V.  (mL/kg/hr) 1759.6  (2.6)  1759.6         Volume (lactated Ringers infusion) 0  0         Volume (0.9% sodium chloride infusion) 339.6  339.6         Volume (lactated Ringers infusion) 1400  1400         Volume (ceFAZolin (ANCEF) 2 g/20 mL in sterile water IV syringe) 20  20       Shift Total  (mL/kg) 1759.6  (31.5)  1759.6  (31.5)      O  U  T  P  U  T   Urine  (mL/kg/hr) 450  (0.7) 1150 1600         Urine Voided 450 1150 1600         Urine Output 0  0       Blood 100  100         Estimated Blood Loss 100  100       Shift Total  (mL/kg) 550  (9.9) 1150  (20.6) 1700  (30.5)      NET 1209.6 -1150 59.6      Weight (kg) 55.8 55.8 55.8 55.8 55.8 55.8       CBC:  No results for input(s): WBC, HGB, HCT, PLT, HGBEXT, HCTEXT, PLTEXT, HGBEXT, HCTEXT, PLTEXT in the last 72 hours. Metabolic Panel:  No results for input(s): NA, K, CL, CO2, BUN, CREA, GLU, CA, MG, PHOS, ALB, TBIL, SGOT, ALT, INR, MG in the last 72 hours. No lab exists for component: INREXT, INREXT       For Billing    No chief complaint on file. Hospital Problems  Date Reviewed: 4/18/2013          Codes Class Noted POA    * (Principal)Painful orthopaedic hardware [facility]) ICD-10-CM: T26.39QJ  ICD-9-CM: 996.78  3/19/2018 Yes        Acquired hypothyroidism (Chronic) ICD-10-CM: E03.9  ICD-9-CM: 244.9  3/19/2018 Yes        Hypercholesteremia (Chronic) ICD-10-CM: E78.00  ICD-9-CM: 272.0  3/19/2018 Yes        Heart disease (Chronic) ICD-10-CM: I51.9  ICD-9-CM: 429.9  3/19/2018 Yes        Cornea abrasion (Chronic) ICD-10-CM: S05. 00XA  ICD-9-CM: 918.1  3/19/2018 Yes    Overview Signed 3/19/2018  1:30 PM by Ham Torres MD     Bilateral             Primary localized osteoarthrosis of shoulder region ICD-10-CM: M19.019  ICD-9-CM: 715.11  4/17/2013 Yes

## 2018-03-20 NOTE — PROGRESS NOTES
Ortho Progress Note     Patient: Veronica Martinez MRN: [id number]  SSN: [id number]    YOB: 1935  Age: [de-identified] y.o. Sex: female      POD:    1 Day Post-Op  S/P:    Procedure(s):  REVISION LEFT TOTAL SHOULDER ARTHROPLASTY TO REVERSE TOTAL,SHOULDER ARTHROPLASTY, BICEPS TENDOESIS, AND AXILLARY NERVE DISSECTION (GEN WITH BLOCK)     Subjective:     Veronica Martinez is resting in bed with the appropriate level of discomfort. The patient denies complaints of dyspnea or chest pain. Objective  Objective:   Patient Vitals for the past 12 hrs:   BP Temp Pulse Resp SpO2   03/20/18 0809 120/67 98.2 °F (36.8 °C) 79 16 -   03/20/18 0351 101/51 98.6 °F (37 °C) 81 16 93 %   03/19/18 2310 99/53 97.5 °F (36.4 °C) 92 18 94 %     No results for input(s): HGB, HCT, INR, NA, K, CL, CO2, BUN, CREA, GLU, HGBEXT, HCTEXT in the last 72 hours. No lab exists for component: INREXT    Patient is alert and oriented x 3 in NAD. The operative shoulder dressing is clean, dry, and intact. The interscalene block catheter is intact without drainage. The patient is neurovascularly intact. The sling and ice are properly positioned on the operative arm. Assessment:     Assessment:   Status post shoulder arthroplasty       Plan:   1. We will continue the current pain management regimen. 2. Physical therapy and occupational therapy will initiate treatment. 3. The patient will be discharged home if medically stable,  the pain level is well controlled and they have been cleared by physical therapy. 4.  Veronica Martinez will be discharged home with the peripheral nerve catheter intact. Explicit discharge instructions have been provided to the patient for removal of the nerve block as well post operative instructions and prescriptions. 5. The patient is to follow-up in the office at the scheduled appointment in 10-14 days.

## 2018-03-20 NOTE — PROGRESS NOTES
Orthopedic & Surgical Nursing Communication Tool      7:42 AM  3/20/2018     Bedside and Verbal shift change report given to Marilee Ceja RN (incoming nurse) by Iveth Durán RN (outgoing nurse) on Elaina Solid a [de-identified] y.o. female who was admitted on 3/19/2018  5:53 AM. Report included the following information SBAR, Kardex, Intake/Output, MAR, Recent Results and Med Rec Status. Hourly rounding completed during shift. No acute distress noted. No pain during shift. Pain well controlled with On-Q. Opportunity for questions and clarifications were given to the incoming nurse. Patient's bed is in low position, side rails x2, door open PRN, call bell within reach and patient not in distress.     Iveth Durán RN

## 2018-03-20 NOTE — PROGRESS NOTES
physical Therapy EVALUATION/DISCHARGE  Patient: Marika Rubi [de-identified] y.o. female)  Date: 3/20/2018  Primary Diagnosis: PAINFUL HARDWARE LEFT SHOULDER  Painful orthopaedic hardware ([address]  Procedure(s) (LRB):  REVISION LEFT TOTAL SHOULDER ARTHROPLASTY TO REVERSE TOTAL,SHOULDER ARTHROPLASTY, BICEPS TENDOESIS, AND AXILLARY NERVE DISSECTION (GEN WITH BLOCK) (Left) 1 Day Post-Op   Precautions:   NWB (NWB LUE, falls)  ASSESSMENT :  Based on the objective data described below, the patient presents with baseline functional strength and ROM to BLE, good balance and safety with transfers, gait and steps following admission for revision left TSA. Patient was received already up in chair dressed and with sling in place to LUE. Patient stood and ambulated 150 feet and up and down 4 steps with CGA. No loss of balance.  present and able to assist patient as needed. She is safe for discharge from a PT standpoint. Skilled physical therapy is not indicated at this time. PLAN :  Discharge Recommendations: Outpatient  Further Equipment Recommendations for Discharge: none     SUBJECTIVE:   Patient stated I feel good.     OBJECTIVE DATA SUMMARY:   HISTORY:    Past Medical History:   Diagnosis Date    Arthritis     Cornea abrasion     Bilateral    Endometrial cancer ([address] [address] 12/1990    H/O seasonal allergies     Headache(784.0)     Heart disease     Hypercholesteremia     Nausea & vomiting     Skin cancer, basal cell 1999 2001, 2005, 2011                     Scalp, lip and lower leg    Thyroid disease 1980     Past Surgical History:   Procedure Laterality Date    HX FRACTURE TX Left 11/2012    Shoulder    HX HYSTERECTOMY  1990    HX LUMBAR DISKECTOMY N/A 07/2015    HX MALIGNANT SKIN LESION EXCISION N/A 06/1999    Scalp                          Basal Cell    HX MALIGNANT SKIN LESION EXCISION N/A 2001    Nose and Lip- 2001, 2005, 2011                  Basal Cell    HX MALIGNANT SKIN LESION EXCISION Left 09/2017 Lower Leg                            Basal Cell    HX PARATHYROIDECTOMY Right 1980    HX SHOULDER ARTHROSCOPY Left 04/2013    Unsuccessful shaving    HX SHOULDER ARTHROSCOPY Left 02/2014    Capsular release, debridement, open subscapularis repair & lysis of adhesions    HX SHOULDER REPLACEMENT Left 10/2013    HX THYROIDECTOMY Right 03/1980    HX TONSIL AND ADENOIDECTOMY      AS CHILD    HX TUBAL LIGATION  1972     Prior Level of Function/Home Situation: independent  Personal factors and/or comorbidities impacting plan of care: none    Home Situation  Home Environment: Private residence  # Steps to Enter: 5  Rails to Enter: Yes  Hand Rails : Right  One/Two Story Residence: Split level  # of Interior Steps: 7  Height of Each Step (in): 0 inches  Interior Rails: Right  Lift Chair Available: No  Living Alone: No  Support Systems: Spouse/Significant Other/Partner  Patient Expects to be Discharged to[de-identified] Private residence  Current DME Used/Available at Home: Blood pressure cuff, Walker, rolling, Shower chair, Cane, straight, Crutches    EXAMINATION/PRESENTATION/DECISION MAKING:   Critical Behavior:  Neurologic State: Alert  Orientation Level: Oriented X4  Cognition: Appropriate decision making, Appropriate for age attention/concentration, Appropriate safety awareness, Follows commands  Safety/Judgement: Good awareness of safety precautions  Hearing: Auditory  Auditory Impairment: None  Skin:  Not fully observed    Range Of Motion:  AROM: Within functional limits (BLE)                       Strength:    Strength:  Within functional limits (BLE)                    Tone & Sensation:   Tone: Normal              Sensation: Intact (BLE)                       Functional Mobility:  Bed Mobility:              Transfers:  Sit to Stand: Contact guard assistance  Stand to Sit: Supervision                       Balance:   Sitting: Intact  Standing: Intact  Ambulation/Gait Training:  Distance (ft): 150 Feet (ft)  Assistive Device: Gait belt;Brace/Splint  Ambulation - Level of Assistance: Contact guard assistance                                                   Stairs:  Number of Stairs Trained: 4  Stairs - Level of Assistance: Contact guard assistance   Rail Use: Right               Physical Therapy Evaluation Charge Determination   History Examination Presentation Decision-Making   LOW Complexity : Zero comorbidities / personal factors that will impact the outcome / POC LOW Complexity : 1-2 Standardized tests and measures addressing body structure, function, activity limitation and / or participation in recreation  LOW Complexity : Stable, uncomplicated  LOW Complexity : FOTO score of       Based on the above components, the patient evaluation is determined to be of the following complexity level: LOW     Pain:  Pain Scale 1: Numeric (0 - 10)  Pain Intensity 1: 0     Activity Tolerance:   good  Please refer to the flowsheet for vital signs taken during this treatment. After treatment:   [x]   Patient left in no apparent distress sitting up in chair  []   Patient left in no apparent distress in bed  [x]   Call bell left within reach  [x]   Nursing notified  [x]   Caregiver present  [x]   Chair alarm activated    COMMUNICATION/EDUCATION:   Communication/Collaboration:  [x]   Fall prevention education was provided and the patient/caregiver indicated understanding. [x]   Patient/family have participated as able and agree with findings and recommendations. []   Patient is unable to participate in plan of care at this time.   Findings and recommendations were discussed with: Occupational Therapist and Registered Nurse    Thank you for this referral.  Crow Dean, PT   Time Calculation: 12 mins

## 2018-03-22 NOTE — DISCHARGE SUMMARY
Reverse Total Shoulder Arthoplasty Discharge Summary    Patient ID:  Ivet Schreiber  1935  [de-identified] y.o.  [id number]    Admit date: 3/19/2018    Discharge date and time: 3/20/2018 12:03 PM     Admitting Physician: Sintia Montalvo MD     Admission Diagnoses: PAINFUL HARDWARE LEFT SHOULDER  Painful orthopaedic hardware [facility])    Discharge Diagnoses: Principal Problem:    Painful orthopaedic hardware ([address] (3/19/2018)    Active Problems:    Primary localized osteoarthrosis of shoulder region (4/17/2013)      Acquired hypothyroidism (3/19/2018)      Hypercholesteremia (3/19/2018)      Heart disease (3/19/2018)      Cornea abrasion (3/19/2018)      Overview: Bilateral        HISTORY OF PRESENT ILLNESS:  Ivet Schreiber is a pleasant [de-identified] y.o. long standing patient with total shoulder and subsequent rotator cuff tear. The patient failed all conservative management. Therefore, Dr. Liss Martinez and the patient decided the most appropriate plan of care is to proceed with a revision to reverse total shoulder arthroplasty, to be preformed at [place] [address]. All preoperative clearance was done prior to surgery. The patient's complete history and physical, laboratory data and physical exam is well documented in hospital chart. HOSPITAL COURSE:  Ivet Schreiber was admitted on3/19/2018 and underwent successful reverse total shoulder arthroplasty. There were no complications intraoperatively and the patient was transferred to the orthopaedic floor in stable condition. A consultations was made to a primary care physician whom continued to monitor the patient throughout the patient's hospitalizations. Physical therapy and occupational therapy initiated their evaluation and treatment and continued to follow the patient until the patient was discharged. For pain control, an interscalene nerve block was used, to be discontinued on post-operative day 2.  The patient then was transitioned over oral narcotics in which was tolerated well. The incision site remained clean, dry, and intact. The patient remained neurovascularly intact. At the time of discharge, the patient was able to ambulate safely, go up and down stairs and had an understanding of the explicit discharge precautions and instructions following surgery. Post Op complications: none    Cannot display discharge medications since this patient is not currently admitted. Discharged to: Home          Discharge instructions:  -Resume pre hospital diet.            -Resume home medications per medication reconciliation form. -Prescriptions for pain medication were provided to the patient.     -Patient to continue shoulder exercises as taught by the physical therapist.  -Ambulate with an assisted device as instructed by PT/OT prior to discharge.   -First follow-up appointment to be scheduled in 10 days. If patient is unaware of their appointment time/date, they are call Dr. John Barnett office at [phone].  -Explicit discharge instructions were provided to the patient.     Chato Kay MD

## 2018-03-27 VITALS
SYSTOLIC BLOOD PRESSURE: 120 MMHG | TEMPERATURE: 98.2 F | RESPIRATION RATE: 16 BRPM | DIASTOLIC BLOOD PRESSURE: 67 MMHG | WEIGHT: 123 LBS | OXYGEN SATURATION: 93 % | BODY MASS INDEX: 20.47 KG/M2 | HEART RATE: 79 BPM

## 2018-03-30 NOTE — OP NOTES
[address] REPORT    Jill Arvizu  MR#: [id number]  : 1935  ACCOUNT #: [de-identified]   DATE OF SERVICE: 2018    PREOPERATIVE DIAGNOSIS:  Failed left total shoulder arthroplasty. POSTOPERATIVE DIAGNOSIS:  Failed left total shoulder arthroplasty. PROCEDURES PERFORMED:    1. Revision of left total shoulder arthroplasty to reverse shoulder arthroplasty. 2.  Axillary nerve dissection. SURGEON:  Bonita Barton MD    ASSISTANT:  Adriane Christopher PA-C    ANESTHESIA:  General with interscalene block. SPECIMENS REMOVED:  None. IMPLANTS:  Biomet comprehensive reverse total shoulder. DETAILS OF THE PROCEDURE:  After being informed of the risks and benefits of the procedure to include, but not limited to bleeding, infection, neurovascular damage, wound complications, pain and stiffness in the shoulder, periarticular fracture and instability, the patient consented for the procedure. After adequate general anesthesia, her left shoulder was prepped and draped in sterile fashion. The incision was made and the deltopectoral interval was identified. The scar within these intervals was released in the subdeltoid and subcoracoid spaces were retracted. The bicipital sheath was incised and the capsule was taken down off the anterior humerus. The subscapularis had previously been torn. We then released the capsule off the humeral neck and the humerus was externally rotated. The humeral head was removed with a \"tuning fork\" and the soft tissue overlying the stem was debrided. We clearly outlined the [facility]. At that point, the glenoid was addressed. A lamina  was placed and the inferior capsule was identified and dissected from the axillary nerve. The axillary nerve was identified and its course was dissected as it passed into the subdeltoid space posteriorly. The nerve was protected and the capsule was released.   The glenoid implant was then cut with an oscillating saw in the shape of a triangle surrounding the central peg. The peripheral portions were removed and the central portion was unscrewed from the main PEG. The trephine was then screwed on to the PEG and the peg was drilled out and removed. We removed the superior glenoid pegs, but were concerned about removing the inferior peg and causing a significant bone defect of the glenoid. We irrigated the shoulder copiously and then using a guide, placed a guide pin into the central portion of the glenoid through the previous peg hole. At that point, the glenoid was then reamed and then irrigated copiously with antibiotic irrigation and baseplate was placed into position. The central screw was then placed with excellent fixation to the glenoid. The peripheral screws were then placed as well. A 36 standard with minimal offset glenosphere was then impacted into position. We tried to reduce the humerus at this time and unfortunately her shoulder was very tight. We had to release her rotator cuff off of the humerus and then release the capsule posteriorly as well. After this was performed, we were just able to reduce her shoulder. Unfortunately, it was a little tighter than I would like, but the only other option would be to remove the humerus and I felt this was too risky and potentially would significantly increase her complication risk. With this in mind, we impacted a humeral baseplate and standard bearing onto the humeral stem and reduced the shoulder. The shoulder was then irrigated copiously with antibiotic irrigation and then a Betadine soak was performed. Vancomycin powder was then placed within the wound and the deltopectoral interval was then closed. The skin was closed in layers. Sterile dressings were applied. The patient was awakened and taken to the recovery room in stable condition. COMPLICATIONS:  There were no complications.       ESTIMATED BLOOD LOSS:  100 mL      MD PITER Ann / Christopher Pathak  D: 03/30/2018 07:21     T: 03/30/2018 12:26  JOB #: [id number]

## 2022-03-18 PROBLEM — E78.00 HYPERCHOLESTEREMIA: Status: ACTIVE | Noted: 2018-03-19

## 2022-03-18 PROBLEM — T84.84XA PAINFUL ORTHOPAEDIC HARDWARE (HCC): Status: ACTIVE | Noted: 2018-03-19

## 2022-03-19 PROBLEM — I51.9 HEART DISEASE: Status: ACTIVE | Noted: 2018-03-19

## 2022-03-19 PROBLEM — S05.00XA CORNEA ABRASION: Status: ACTIVE | Noted: 2018-03-19

## 2022-03-19 PROBLEM — E03.9 ACQUIRED HYPOTHYROIDISM: Status: ACTIVE | Noted: 2018-03-19

## 2024-04-16 PROBLEM — M81.0 OSTEOPOROSIS: Status: ACTIVE | Noted: 2024-04-16

## 2024-04-16 RX ORDER — ALBUTEROL SULFATE 90 UG/1
4 AEROSOL, METERED RESPIRATORY (INHALATION) PRN
OUTPATIENT
Start: 2024-04-23

## 2024-04-16 RX ORDER — ONDANSETRON 2 MG/ML
8 INJECTION INTRAMUSCULAR; INTRAVENOUS
OUTPATIENT
Start: 2024-04-23

## 2024-04-16 RX ORDER — SODIUM CHLORIDE 9 MG/ML
INJECTION, SOLUTION INTRAVENOUS CONTINUOUS
OUTPATIENT
Start: 2024-04-23

## 2024-04-16 RX ORDER — EPINEPHRINE 1 MG/ML
0.3 INJECTION, SOLUTION INTRAMUSCULAR; SUBCUTANEOUS PRN
OUTPATIENT
Start: 2024-04-23

## 2024-04-16 RX ORDER — ACETAMINOPHEN 325 MG/1
650 TABLET ORAL
OUTPATIENT
Start: 2024-04-23

## 2024-04-16 RX ORDER — DIPHENHYDRAMINE HYDROCHLORIDE 50 MG/ML
50 INJECTION INTRAMUSCULAR; INTRAVENOUS
OUTPATIENT
Start: 2024-04-23

## 2024-04-23 ENCOUNTER — HOSPITAL ENCOUNTER (OUTPATIENT)
Facility: HOSPITAL | Age: 89
Setting detail: INFUSION SERIES
Discharge: HOME | End: 2024-04-23
Payer: MEDICARE

## 2024-04-23 VITALS
DIASTOLIC BLOOD PRESSURE: 57 MMHG | HEART RATE: 75 BPM | RESPIRATION RATE: 18 BRPM | TEMPERATURE: 97.6 F | WEIGHT: 118.39 LBS | SYSTOLIC BLOOD PRESSURE: 108 MMHG

## 2024-04-23 DIAGNOSIS — M81.0 OSTEOPOROSIS, UNSPECIFIED OSTEOPOROSIS TYPE, UNSPECIFIED PATHOLOGICAL FRACTURE PRESENCE: Primary | ICD-10-CM

## 2024-04-23 PROCEDURE — 96365 THER/PROPH/DIAG IV INF INIT: CPT

## 2024-04-23 PROCEDURE — 2580000003 HC RX 258: Performed by: INTERNAL MEDICINE

## 2024-04-23 PROCEDURE — 6360000002 HC RX W HCPCS: Performed by: INTERNAL MEDICINE

## 2024-04-23 RX ORDER — HEPARIN 100 UNIT/ML
500 SYRINGE INTRAVENOUS PRN
OUTPATIENT
Start: 2025-04-20

## 2024-04-23 RX ORDER — SODIUM CHLORIDE 0.9 % (FLUSH) 0.9 %
5-40 SYRINGE (ML) INJECTION PRN
OUTPATIENT
Start: 2025-04-20

## 2024-04-23 RX ORDER — SODIUM CHLORIDE 9 MG/ML
5-250 INJECTION, SOLUTION INTRAVENOUS PRN
Status: DISCONTINUED | OUTPATIENT
Start: 2024-04-23 | End: 2024-04-24 | Stop reason: HOSPADM

## 2024-04-23 RX ORDER — SODIUM CHLORIDE 9 MG/ML
5-250 INJECTION, SOLUTION INTRAVENOUS PRN
OUTPATIENT
Start: 2025-04-20

## 2024-04-23 RX ORDER — DIPHENHYDRAMINE HYDROCHLORIDE 50 MG/ML
50 INJECTION INTRAMUSCULAR; INTRAVENOUS
OUTPATIENT
Start: 2025-04-20

## 2024-04-23 RX ORDER — HEPARIN 100 UNIT/ML
500 SYRINGE INTRAVENOUS PRN
Status: DISCONTINUED | OUTPATIENT
Start: 2024-04-23 | End: 2024-04-24 | Stop reason: HOSPADM

## 2024-04-23 RX ORDER — ZOLEDRONIC ACID 5 MG/100ML
5 INJECTION, SOLUTION INTRAVENOUS ONCE
Status: COMPLETED | OUTPATIENT
Start: 2024-04-23 | End: 2024-04-23

## 2024-04-23 RX ORDER — SODIUM CHLORIDE 9 MG/ML
INJECTION, SOLUTION INTRAVENOUS CONTINUOUS
OUTPATIENT
Start: 2025-04-20

## 2024-04-23 RX ORDER — ZOLEDRONIC ACID 5 MG/100ML
5 INJECTION, SOLUTION INTRAVENOUS ONCE
OUTPATIENT
Start: 2025-04-20 | End: 2025-04-20

## 2024-04-23 RX ORDER — ACETAMINOPHEN 325 MG/1
650 TABLET ORAL
OUTPATIENT
Start: 2025-04-20

## 2024-04-23 RX ORDER — ONDANSETRON 2 MG/ML
8 INJECTION INTRAMUSCULAR; INTRAVENOUS
OUTPATIENT
Start: 2025-04-20

## 2024-04-23 RX ORDER — ALBUTEROL SULFATE 90 UG/1
4 AEROSOL, METERED RESPIRATORY (INHALATION) PRN
OUTPATIENT
Start: 2025-04-20

## 2024-04-23 RX ORDER — EPINEPHRINE 1 MG/ML
0.3 INJECTION, SOLUTION INTRAMUSCULAR; SUBCUTANEOUS PRN
OUTPATIENT
Start: 2025-04-20

## 2024-04-23 RX ORDER — SODIUM CHLORIDE 0.9 % (FLUSH) 0.9 %
5-40 SYRINGE (ML) INJECTION PRN
Status: DISCONTINUED | OUTPATIENT
Start: 2024-04-23 | End: 2024-04-24 | Stop reason: HOSPADM

## 2024-04-23 RX ADMIN — ZOLEDRONIC ACID 5 MG: 5 INJECTION, SOLUTION INTRAVENOUS at 14:22

## 2024-04-23 RX ADMIN — SODIUM CHLORIDE 25 ML/HR: 9 INJECTION, SOLUTION INTRAVENOUS at 14:15

## 2024-04-23 ASSESSMENT — PAIN SCALES - GENERAL: PAINLEVEL_OUTOF10: 0

## 2024-04-23 NOTE — PROGRESS NOTES
[facility] Peds/Adult Note                       Date: 2024    Name: Clary Ignacio    MRN: [id number]         : 1935    1400 Patient arrives for Reclast without acute problems. Please see Epic for complete assessment and education provided.    Vital signs stable throughout and prior to discharge. Patient tolerated procedure well and was discharged without incident. Patient is aware of no further [facility] appointments and to follow up with referring provider for any questions or concerns.      Ms. Ignacio's vitals were reviewed prior to and after treatment.   Patient Vitals for the past 12 hrs:   Temp Pulse Resp BP   24 1445 -- 75 18 (!) 108/57   24 1400 97.6 °F (36.4 °C) 80 18 111/65       Lab results were obtained on 4/3/2024, scanned into the chart and reviewed today.    Medications given:   Medications Administered         0.9 % sodium chloride infusion Admin Date  2024 Action  New Bag Dose  25 mL/hr Rate  25 mL/hr Route  IntraVENous Administered By  Juliann Alegre RN        zoledronic acid (RECLAST) 5 mg/100 mL infusion Admin Date  2024 Action  New Bag Dose  5 mg Rate  400 mL/hr Route  IntraVENous Administered By  Juliann Alegre RN            Ms. Ignacio tolerated the infusion, and had no complaints.    [facility]. Discharge Instructions provided to patient, patient verbalized understanding but denied the request for a copy of after visit summary.     No future appointments.    JULIANN ALEGRE RN  2024  4:15 PM

## 2025-05-12 RX ORDER — HEPARIN 100 UNIT/ML
500 SYRINGE INTRAVENOUS PRN
OUTPATIENT
Start: 2025-05-19

## 2025-05-12 RX ORDER — SODIUM CHLORIDE 9 MG/ML
5-250 INJECTION, SOLUTION INTRAVENOUS PRN
OUTPATIENT
Start: 2025-05-19

## 2025-05-19 ENCOUNTER — HOSPITAL ENCOUNTER (OUTPATIENT)
Facility: HOSPITAL | Age: 89
Setting detail: INFUSION SERIES
Discharge: HOME | End: 2025-05-19
Payer: MEDICARE

## 2025-05-19 VITALS
WEIGHT: 115 LBS | SYSTOLIC BLOOD PRESSURE: 106 MMHG | RESPIRATION RATE: 16 BRPM | OXYGEN SATURATION: 97 % | TEMPERATURE: 97.7 F | HEART RATE: 75 BPM | DIASTOLIC BLOOD PRESSURE: 64 MMHG

## 2025-05-19 DIAGNOSIS — M81.0 OSTEOPOROSIS, UNSPECIFIED OSTEOPOROSIS TYPE, UNSPECIFIED PATHOLOGICAL FRACTURE PRESENCE: Primary | ICD-10-CM

## 2025-05-19 PROCEDURE — 2580000003 HC RX 258: Performed by: INTERNAL MEDICINE

## 2025-05-19 PROCEDURE — 6360000002 HC RX W HCPCS: Performed by: INTERNAL MEDICINE

## 2025-05-19 PROCEDURE — 96374 THER/PROPH/DIAG INJ IV PUSH: CPT

## 2025-05-19 PROCEDURE — 2500000003 HC RX 250 WO HCPCS: Performed by: INTERNAL MEDICINE

## 2025-05-19 RX ORDER — SODIUM CHLORIDE 0.9 % (FLUSH) 0.9 %
5-40 SYRINGE (ML) INJECTION PRN
Status: DISCONTINUED | OUTPATIENT
Start: 2025-05-19 | End: 2025-05-20 | Stop reason: HOSPADM

## 2025-05-19 RX ORDER — HEPARIN 100 UNIT/ML
500 SYRINGE INTRAVENOUS PRN
OUTPATIENT
Start: 2026-05-17

## 2025-05-19 RX ORDER — SODIUM CHLORIDE 9 MG/ML
5-250 INJECTION, SOLUTION INTRAVENOUS PRN
Status: DISCONTINUED | OUTPATIENT
Start: 2025-05-19 | End: 2025-05-20 | Stop reason: HOSPADM

## 2025-05-19 RX ORDER — SODIUM CHLORIDE 9 MG/ML
5-250 INJECTION, SOLUTION INTRAVENOUS PRN
OUTPATIENT
Start: 2026-05-17

## 2025-05-19 RX ORDER — SODIUM CHLORIDE 0.9 % (FLUSH) 0.9 %
5-40 SYRINGE (ML) INJECTION PRN
OUTPATIENT
Start: 2026-05-17

## 2025-05-19 RX ORDER — ZOLEDRONIC ACID 0.05 MG/ML
5 INJECTION, SOLUTION INTRAVENOUS ONCE
Status: CANCELLED | OUTPATIENT
Start: 2026-05-17 | End: 2026-05-17

## 2025-05-19 RX ORDER — ZOLEDRONIC ACID 0.05 MG/ML
5 INJECTION, SOLUTION INTRAVENOUS ONCE
Status: COMPLETED | OUTPATIENT
Start: 2025-05-19 | End: 2025-05-19

## 2025-05-19 RX ADMIN — SODIUM CHLORIDE, PRESERVATIVE FREE 10 ML: 5 INJECTION INTRAVENOUS at 14:12

## 2025-05-19 RX ADMIN — ZOLEDRONIC ACID 5 MG: 5 INJECTION INTRAVENOUS at 14:20

## 2025-05-19 RX ADMIN — SODIUM CHLORIDE 150 ML/HR: 0.9 INJECTION, SOLUTION INTRAVENOUS at 14:15

## 2025-05-19 ASSESSMENT — PAIN SCALES - GENERAL
PAINLEVEL_OUTOF10: 0
PAINLEVEL_OUTOF10: 0

## 2025-05-19 NOTE — PROGRESS NOTES
[facility] Peds/Adult Note                       Date: May 19, 2025    Name: Clary Ignacio    MRN: [id number]         : 1935    1358 Patient arrives for Yearly Reclast Infusion without acute problems. Please see Epic for complete assessment and education provided.    Vital signs stable throughout and prior to discharge. Patient tolerated procedure well and was discharged without incident.  Patient is aware of no further [facility] appointments @ this time and to follow up with healthcare provider for next appointment.       Ms. Ignacio's vitals were reviewed prior to and after treatment.   Patient Vitals for the past 12 hrs:   Temp Pulse Resp BP SpO2   25 1437 97.7 °F (36.5 °C) 75 16 106/64 --   25 1358 97.7 °F (36.5 °C) 82 18 120/63 97 %         Lab results were obtained and reviewed.  Labs obtained 2025.     Medications given:   Medications Administered         0.9 % sodium chloride infusion Admin Date  2025 Action  New Bag Dose  150 mL/hr Rate  150 mL/hr Route  IntraVENous Documented By  Flash Figueredo RN        sodium chloride flush 0.9 % injection 5-40 mL Admin Date  2025 Action  Given Dose  10 mL Rate   Route  IntraVENous Documented By  Flash Figueredo RN        zoledronic acid (RECLAST) 5 mg/100 mL infusion Admin Date  2025 Action  New Bag Dose  5 mg Rate  400 mL/hr Route  IntraVENous Documented By  Flash Figueredo RN          Ms. Igncaio tolerated the infusion, and had no complaints.    Ms. Ignacio was [facility] in stable condition.     No future appointments.    FLASH FIGUEREDO RN  May 19, 2025  3:30 PM

## (undated) DEVICE — SUTURE MCRYL SZ 4-0 L27IN ABSRB UD L19MM PS-2 1/2 CIR PRIM Y426H

## (undated) DEVICE — DRAPE,U/ SHT,SPLIT,PLAS,STERIL: Brand: MEDLINE

## (undated) DEVICE — DEVON™ KNEE AND BODY STRAP 60" X 3" (1.5 M X 7.6 CM): Brand: DEVON

## (undated) DEVICE — LIGHT HANDLE: Brand: DEVON

## (undated) DEVICE — HANDPIECE SET WITH COAXIAL HIGH FLOW TIP AND SUCTION TUBE: Brand: INTERPULSE

## (undated) DEVICE — BOOT ORTH XL KNEE GRN TYVEK HI CVR SKID RESIST HEAT SEAL E

## (undated) DEVICE — PLASMABLADE PS210-030S 3.0S LOCK: Brand: PLASMABLADE™

## (undated) DEVICE — YANKAUER OPEN TIP, NO VENT: Brand: ARGYLE

## (undated) DEVICE — SUTURE VCRL SZ 0 L36IN ABSRB UD L40MM CT 1/2 CIR TAPERPOINT J958H

## (undated) DEVICE — COVER,MAYO STAND,STERILE: Brand: MEDLINE

## (undated) DEVICE — 4-PORT MANIFOLD: Brand: NEPTUNE 2

## (undated) DEVICE — Device

## (undated) DEVICE — SUTURE VCRL SZ 2-0 L27IN ABSRB UD L36MM CP-1 1/2 CIR REV J266H

## (undated) DEVICE — INFECTION CONTROL KIT SYS

## (undated) DEVICE — STERILE POLYISOPRENE POWDER-FREE SURGICAL GLOVES: Brand: PROTEXIS

## (undated) DEVICE — SOLUTION IRRIG 3000ML 0.9% SOD CHL FLX CONT 0797208] ICU MEDICAL INC]

## (undated) DEVICE — TELFA NON-ADHERENT ABSORBENT DRESSING: Brand: TELFA

## (undated) DEVICE — 3M™ IOBAN™ 2 ANTIMICROBIAL INCISE DRAPE 6650EZ: Brand: IOBAN™ 2

## (undated) DEVICE — REM POLYHESIVE ADULT PATIENT RETURN ELECTRODE: Brand: VALLEYLAB

## (undated) DEVICE — SUTURE FIBERWIRE SZ 2 W/ TAPERED NEEDLE BLUE L38IN NONABSORB BLU L26.5MM 1/2 CIRCLE AR7200

## (undated) DEVICE — SUTURE ETHBND EXCEL SZ 5 L30IN NONABSORBABLE GRN L40MM V-37 MB66G

## (undated) DEVICE — T4 HOOD

## (undated) DEVICE — 3M™ IOBAN™ 2 ANTIMICROBIAL INCISE DRAPE 6651EZ: Brand: IOBAN™ 2

## (undated) DEVICE — ARGYLE FRAZIER SURGICAL SUCTION INSTRUMENT 10 FR/CH (3.3 MM): Brand: ARGYLE

## (undated) DEVICE — 3M™ TEGADERM™ TRANSPARENT FILM DRESSING FRAME STYLE, 1628, 6 IN X 8 IN (15 CM X 20 CM), 10/CT 8CT/CASE: Brand: 3M™ TEGADERM™

## (undated) DEVICE — DERMABOND SKIN ADH 0.7ML -- DERMABOND ADVANCED 12/BX

## (undated) DEVICE — GOWN,BREATHABLE,IMP SLV 3XL AURORA: Brand: MEDLINE

## (undated) DEVICE — DRAPE,REIN 53X77,STERILE: Brand: MEDLINE

## (undated) DEVICE — (D)PREP SKN CHLRAPRP APPL 26ML -- CONVERT TO ITEM 371833

## (undated) DEVICE — STERILE POLYISOPRENE POWDER-FREE SURGICAL GLOVES WITH EMOLLIENT COATING: Brand: PROTEXIS

## (undated) DEVICE — T-MAX DISPOSABLE FACE MASK 8 PER BOX

## (undated) DEVICE — TIBURON SPLIT SHEET: Brand: CONVERTORS

## (undated) DEVICE — STRYKER PERFORMANCE SERIES SAGITTAL BLADE: Brand: STRYKER PERFORMANCE SERIES